# Patient Record
Sex: FEMALE | Race: WHITE | Employment: OTHER | URBAN - METROPOLITAN AREA
[De-identification: names, ages, dates, MRNs, and addresses within clinical notes are randomized per-mention and may not be internally consistent; named-entity substitution may affect disease eponyms.]

---

## 2022-10-09 ENCOUNTER — APPOINTMENT (OUTPATIENT)
Dept: GENERAL RADIOLOGY | Facility: CLINIC | Age: 87
DRG: 380 | End: 2022-10-09
Attending: EMERGENCY MEDICINE
Payer: MEDICARE

## 2022-10-09 ENCOUNTER — APPOINTMENT (OUTPATIENT)
Dept: CT IMAGING | Facility: CLINIC | Age: 87
DRG: 380 | End: 2022-10-09
Attending: EMERGENCY MEDICINE
Payer: MEDICARE

## 2022-10-09 ENCOUNTER — HOSPITAL ENCOUNTER (INPATIENT)
Facility: CLINIC | Age: 87
LOS: 4 days | Discharge: HOME | DRG: 380 | End: 2022-10-13
Attending: EMERGENCY MEDICINE | Admitting: INTERNAL MEDICINE
Payer: MEDICARE

## 2022-10-09 DIAGNOSIS — K29.00 ACUTE GASTRITIS WITHOUT HEMORRHAGE, UNSPECIFIED GASTRITIS TYPE: ICD-10-CM

## 2022-10-09 DIAGNOSIS — I5A MYOCARDIAL INJURY: ICD-10-CM

## 2022-10-09 DIAGNOSIS — I48.91 ATRIAL FIBRILLATION WITH RAPID VENTRICULAR RESPONSE (H): ICD-10-CM

## 2022-10-09 DIAGNOSIS — R11.2 NAUSEA AND VOMITING, UNSPECIFIED VOMITING TYPE: ICD-10-CM

## 2022-10-09 DIAGNOSIS — R93.89 THICKENED ENDOMETRIUM: ICD-10-CM

## 2022-10-09 LAB
ALBUMIN SERPL BCG-MCNC: 3.8 G/DL (ref 3.5–5.2)
ALBUMIN UR-MCNC: 20 MG/DL
ALP SERPL-CCNC: 52 U/L (ref 35–104)
ALT SERPL W P-5'-P-CCNC: 13 U/L (ref 10–35)
ANION GAP SERPL CALCULATED.3IONS-SCNC: 12 MMOL/L (ref 7–15)
APPEARANCE UR: CLEAR
AST SERPL W P-5'-P-CCNC: 21 U/L (ref 10–35)
BILIRUB DIRECT SERPL-MCNC: <0.2 MG/DL (ref 0–0.3)
BILIRUB SERPL-MCNC: 0.4 MG/DL
BILIRUB UR QL STRIP: NEGATIVE
BUN SERPL-MCNC: 26 MG/DL (ref 8–23)
CALCIUM SERPL-MCNC: 11.1 MG/DL (ref 8.8–10.2)
CHLORIDE SERPL-SCNC: 96 MMOL/L (ref 98–107)
COLOR UR AUTO: ABNORMAL
CREAT SERPL-MCNC: 1.49 MG/DL (ref 0.51–0.95)
DEPRECATED HCO3 PLAS-SCNC: 32 MMOL/L (ref 22–29)
ERYTHROCYTE [DISTWIDTH] IN BLOOD BY AUTOMATED COUNT: 12.5 % (ref 10–15)
GFR SERPL CREATININE-BSD FRML MDRD: 34 ML/MIN/1.73M2
GLUCOSE BLDC GLUCOMTR-MCNC: 122 MG/DL (ref 70–99)
GLUCOSE BLDC GLUCOMTR-MCNC: 147 MG/DL (ref 70–99)
GLUCOSE SERPL-MCNC: 218 MG/DL (ref 70–99)
GLUCOSE UR STRIP-MCNC: NEGATIVE MG/DL
HBA1C MFR BLD: 6.4 %
HCT VFR BLD AUTO: 40.5 % (ref 35–47)
HGB BLD-MCNC: 13.2 G/DL (ref 11.7–15.7)
HGB UR QL STRIP: NEGATIVE
HOLD SPECIMEN: NORMAL
HOLD SPECIMEN: NORMAL
KETONES UR STRIP-MCNC: NEGATIVE MG/DL
LEUKOCYTE ESTERASE UR QL STRIP: NEGATIVE
LIPASE SERPL-CCNC: 33 U/L (ref 13–60)
MAGNESIUM SERPL-MCNC: 1.8 MG/DL (ref 1.7–2.3)
MCH RBC QN AUTO: 30.1 PG (ref 26.5–33)
MCHC RBC AUTO-ENTMCNC: 32.6 G/DL (ref 31.5–36.5)
MCV RBC AUTO: 92 FL (ref 78–100)
NITRATE UR QL: NEGATIVE
PH UR STRIP: 7.5 [PH] (ref 5–7)
PLATELET # BLD AUTO: 215 10E3/UL (ref 150–450)
POTASSIUM SERPL-SCNC: 3.7 MMOL/L (ref 3.4–5.3)
PROCALCITONIN SERPL IA-MCNC: 0.03 NG/ML
PROT SERPL-MCNC: 7.1 G/DL (ref 6.4–8.3)
RBC # BLD AUTO: 4.39 10E6/UL (ref 3.8–5.2)
RBC URINE: 2 /HPF
SARS-COV-2 RNA RESP QL NAA+PROBE: NEGATIVE
SODIUM SERPL-SCNC: 140 MMOL/L (ref 136–145)
SP GR UR STRIP: 1.01 (ref 1–1.03)
SQUAMOUS EPITHELIAL: 1 /HPF
TROPONIN T SERPL HS-MCNC: 37 NG/L
TROPONIN T SERPL HS-MCNC: 37 NG/L
TSH SERPL DL<=0.005 MIU/L-ACNC: 3.12 UIU/ML (ref 0.3–4.2)
UFH PPP CHRO-ACNC: 0.18 IU/ML
UROBILINOGEN UR STRIP-MCNC: NORMAL MG/DL
WBC # BLD AUTO: 10.6 10E3/UL (ref 4–11)
WBC URINE: 8 /HPF

## 2022-10-09 PROCEDURE — 99223 1ST HOSP IP/OBS HIGH 75: CPT | Mod: AI | Performed by: INTERNAL MEDICINE

## 2022-10-09 PROCEDURE — 250N000011 HC RX IP 250 OP 636: Performed by: EMERGENCY MEDICINE

## 2022-10-09 PROCEDURE — 83690 ASSAY OF LIPASE: CPT | Performed by: EMERGENCY MEDICINE

## 2022-10-09 PROCEDURE — 84145 PROCALCITONIN (PCT): CPT | Performed by: INTERNAL MEDICINE

## 2022-10-09 PROCEDURE — C9113 INJ PANTOPRAZOLE SODIUM, VIA: HCPCS | Performed by: EMERGENCY MEDICINE

## 2022-10-09 PROCEDURE — 250N000011 HC RX IP 250 OP 636: Performed by: INTERNAL MEDICINE

## 2022-10-09 PROCEDURE — 76604 US EXAM CHEST: CPT

## 2022-10-09 PROCEDURE — C9803 HOPD COVID-19 SPEC COLLECT: HCPCS

## 2022-10-09 PROCEDURE — 120N000001 HC R&B MED SURG/OB

## 2022-10-09 PROCEDURE — 99285 EMERGENCY DEPT VISIT HI MDM: CPT | Mod: 25

## 2022-10-09 PROCEDURE — 96366 THER/PROPH/DIAG IV INF ADDON: CPT

## 2022-10-09 PROCEDURE — 96365 THER/PROPH/DIAG IV INF INIT: CPT

## 2022-10-09 PROCEDURE — 250N000013 HC RX MED GY IP 250 OP 250 PS 637: Performed by: EMERGENCY MEDICINE

## 2022-10-09 PROCEDURE — 85027 COMPLETE CBC AUTOMATED: CPT | Performed by: EMERGENCY MEDICINE

## 2022-10-09 PROCEDURE — 96361 HYDRATE IV INFUSION ADD-ON: CPT

## 2022-10-09 PROCEDURE — 84484 ASSAY OF TROPONIN QUANT: CPT | Performed by: EMERGENCY MEDICINE

## 2022-10-09 PROCEDURE — 36415 COLL VENOUS BLD VENIPUNCTURE: CPT | Performed by: INTERNAL MEDICINE

## 2022-10-09 PROCEDURE — 83735 ASSAY OF MAGNESIUM: CPT | Performed by: EMERGENCY MEDICINE

## 2022-10-09 PROCEDURE — 36415 COLL VENOUS BLD VENIPUNCTURE: CPT | Performed by: EMERGENCY MEDICINE

## 2022-10-09 PROCEDURE — 250N000013 HC RX MED GY IP 250 OP 250 PS 637: Performed by: INTERNAL MEDICINE

## 2022-10-09 PROCEDURE — 80048 BASIC METABOLIC PNL TOTAL CA: CPT | Performed by: EMERGENCY MEDICINE

## 2022-10-09 PROCEDURE — 84484 ASSAY OF TROPONIN QUANT: CPT | Performed by: INTERNAL MEDICINE

## 2022-10-09 PROCEDURE — 96376 TX/PRO/DX INJ SAME DRUG ADON: CPT

## 2022-10-09 PROCEDURE — 93005 ELECTROCARDIOGRAM TRACING: CPT | Mod: 76

## 2022-10-09 PROCEDURE — 83036 HEMOGLOBIN GLYCOSYLATED A1C: CPT | Performed by: INTERNAL MEDICINE

## 2022-10-09 PROCEDURE — U0005 INFEC AGEN DETEC AMPLI PROBE: HCPCS | Performed by: EMERGENCY MEDICINE

## 2022-10-09 PROCEDURE — 258N000003 HC RX IP 258 OP 636: Performed by: EMERGENCY MEDICINE

## 2022-10-09 PROCEDURE — C9113 INJ PANTOPRAZOLE SODIUM, VIA: HCPCS | Performed by: INTERNAL MEDICINE

## 2022-10-09 PROCEDURE — 999N000065 XR ABDOMEN 1 VIEW

## 2022-10-09 PROCEDURE — 81001 URINALYSIS AUTO W/SCOPE: CPT | Performed by: INTERNAL MEDICINE

## 2022-10-09 PROCEDURE — 250N000009 HC RX 250: Performed by: INTERNAL MEDICINE

## 2022-10-09 PROCEDURE — 84443 ASSAY THYROID STIM HORMONE: CPT | Performed by: EMERGENCY MEDICINE

## 2022-10-09 PROCEDURE — 85520 HEPARIN ASSAY: CPT | Performed by: EMERGENCY MEDICINE

## 2022-10-09 PROCEDURE — 80076 HEPATIC FUNCTION PANEL: CPT | Performed by: EMERGENCY MEDICINE

## 2022-10-09 PROCEDURE — 74177 CT ABD & PELVIS W/CONTRAST: CPT | Mod: MA

## 2022-10-09 PROCEDURE — 258N000003 HC RX IP 258 OP 636: Performed by: INTERNAL MEDICINE

## 2022-10-09 PROCEDURE — 93005 ELECTROCARDIOGRAM TRACING: CPT

## 2022-10-09 RX ORDER — ONDANSETRON 4 MG/1
4 TABLET, ORALLY DISINTEGRATING ORAL EVERY 6 HOURS PRN
Status: DISCONTINUED | OUTPATIENT
Start: 2022-10-09 | End: 2022-10-13 | Stop reason: HOSPADM

## 2022-10-09 RX ORDER — LIDOCAINE 40 MG/G
CREAM TOPICAL
Status: DISCONTINUED | OUTPATIENT
Start: 2022-10-09 | End: 2022-10-09

## 2022-10-09 RX ORDER — DILTIAZEM HYDROCHLORIDE 5 MG/ML
10 INJECTION INTRAVENOUS ONCE
Status: COMPLETED | OUTPATIENT
Start: 2022-10-09 | End: 2022-10-09

## 2022-10-09 RX ORDER — MAGNESIUM HYDROXIDE/ALUMINUM HYDROXICE/SIMETHICONE 120; 1200; 1200 MG/30ML; MG/30ML; MG/30ML
30 SUSPENSION ORAL ONCE
Status: COMPLETED | OUTPATIENT
Start: 2022-10-09 | End: 2022-10-09

## 2022-10-09 RX ORDER — IOPAMIDOL 755 MG/ML
500 INJECTION, SOLUTION INTRAVASCULAR ONCE
Status: COMPLETED | OUTPATIENT
Start: 2022-10-09 | End: 2022-10-09

## 2022-10-09 RX ORDER — DIPHENHYDRAMINE HYDROCHLORIDE AND LIDOCAINE HYDROCHLORIDE AND ALUMINUM HYDROXIDE AND MAGNESIUM HYDRO
10 KIT ONCE
Status: COMPLETED | OUTPATIENT
Start: 2022-10-09 | End: 2022-10-09

## 2022-10-09 RX ORDER — ONDANSETRON 2 MG/ML
4 INJECTION INTRAMUSCULAR; INTRAVENOUS ONCE
Status: COMPLETED | OUTPATIENT
Start: 2022-10-09 | End: 2022-10-09

## 2022-10-09 RX ORDER — NICOTINE POLACRILEX 4 MG
15-30 LOZENGE BUCCAL
Status: DISCONTINUED | OUTPATIENT
Start: 2022-10-09 | End: 2022-10-13 | Stop reason: HOSPADM

## 2022-10-09 RX ORDER — GABAPENTIN 100 MG/1
400 CAPSULE ORAL EVERY EVENING
COMMUNITY

## 2022-10-09 RX ORDER — NALOXONE HYDROCHLORIDE 0.4 MG/ML
0.2 INJECTION, SOLUTION INTRAMUSCULAR; INTRAVENOUS; SUBCUTANEOUS
Status: DISCONTINUED | OUTPATIENT
Start: 2022-10-09 | End: 2022-10-13 | Stop reason: HOSPADM

## 2022-10-09 RX ORDER — ACETAMINOPHEN 325 MG/1
650 TABLET ORAL EVERY 6 HOURS PRN
Status: DISCONTINUED | OUTPATIENT
Start: 2022-10-09 | End: 2022-10-13 | Stop reason: HOSPADM

## 2022-10-09 RX ORDER — LIDOCAINE 40 MG/G
CREAM TOPICAL
Status: DISCONTINUED | OUTPATIENT
Start: 2022-10-09 | End: 2022-10-13 | Stop reason: HOSPADM

## 2022-10-09 RX ORDER — GABAPENTIN 100 MG/1
100 CAPSULE ORAL AT BEDTIME
COMMUNITY

## 2022-10-09 RX ORDER — DEXTROSE MONOHYDRATE 25 G/50ML
25-50 INJECTION, SOLUTION INTRAVENOUS
Status: DISCONTINUED | OUTPATIENT
Start: 2022-10-09 | End: 2022-10-13 | Stop reason: HOSPADM

## 2022-10-09 RX ORDER — NALOXONE HYDROCHLORIDE 0.4 MG/ML
0.4 INJECTION, SOLUTION INTRAMUSCULAR; INTRAVENOUS; SUBCUTANEOUS
Status: DISCONTINUED | OUTPATIENT
Start: 2022-10-09 | End: 2022-10-13 | Stop reason: HOSPADM

## 2022-10-09 RX ORDER — HEPARIN SODIUM 10000 [USP'U]/100ML
0-5000 INJECTION, SOLUTION INTRAVENOUS CONTINUOUS
Status: DISCONTINUED | OUTPATIENT
Start: 2022-10-09 | End: 2022-10-09

## 2022-10-09 RX ORDER — AMOXICILLIN 250 MG
1 CAPSULE ORAL 2 TIMES DAILY PRN
Status: DISCONTINUED | OUTPATIENT
Start: 2022-10-09 | End: 2022-10-13 | Stop reason: HOSPADM

## 2022-10-09 RX ORDER — METOPROLOL SUCCINATE 50 MG/1
50 TABLET, EXTENDED RELEASE ORAL EVERY EVENING
Status: CANCELLED | OUTPATIENT
Start: 2022-10-09

## 2022-10-09 RX ORDER — NITROGLYCERIN 0.4 MG/1
0.4 TABLET SUBLINGUAL EVERY 5 MIN PRN
Status: DISCONTINUED | OUTPATIENT
Start: 2022-10-09 | End: 2022-10-13 | Stop reason: HOSPADM

## 2022-10-09 RX ORDER — METOPROLOL SUCCINATE 50 MG/1
50 TABLET, EXTENDED RELEASE ORAL EVERY EVENING
COMMUNITY
End: 2023-08-14

## 2022-10-09 RX ORDER — VALSARTAN 80 MG/1
80 TABLET ORAL DAILY
COMMUNITY

## 2022-10-09 RX ORDER — AMOXICILLIN 250 MG
2 CAPSULE ORAL 2 TIMES DAILY PRN
Status: DISCONTINUED | OUTPATIENT
Start: 2022-10-09 | End: 2022-10-13 | Stop reason: HOSPADM

## 2022-10-09 RX ORDER — METOPROLOL TARTRATE 1 MG/ML
5 INJECTION, SOLUTION INTRAVENOUS EVERY 6 HOURS
Status: DISCONTINUED | OUTPATIENT
Start: 2022-10-09 | End: 2022-10-10

## 2022-10-09 RX ORDER — AMPICILLIN AND SULBACTAM 2; 1 G/1; G/1
3 INJECTION, POWDER, FOR SOLUTION INTRAMUSCULAR; INTRAVENOUS EVERY 12 HOURS
Status: DISCONTINUED | OUTPATIENT
Start: 2022-10-09 | End: 2022-10-10

## 2022-10-09 RX ORDER — HYDROMORPHONE HCL IN WATER/PF 6 MG/30 ML
0.2 PATIENT CONTROLLED ANALGESIA SYRINGE INTRAVENOUS
Status: DISCONTINUED | OUTPATIENT
Start: 2022-10-09 | End: 2022-10-13 | Stop reason: HOSPADM

## 2022-10-09 RX ORDER — HYDROCHLOROTHIAZIDE 25 MG/1
25 TABLET ORAL DAILY
COMMUNITY

## 2022-10-09 RX ORDER — ACETAMINOPHEN 650 MG/1
650 SUPPOSITORY RECTAL EVERY 6 HOURS PRN
Status: DISCONTINUED | OUTPATIENT
Start: 2022-10-09 | End: 2022-10-13 | Stop reason: HOSPADM

## 2022-10-09 RX ORDER — ONDANSETRON 2 MG/ML
4 INJECTION INTRAMUSCULAR; INTRAVENOUS EVERY 6 HOURS PRN
Status: DISCONTINUED | OUTPATIENT
Start: 2022-10-09 | End: 2022-10-13 | Stop reason: HOSPADM

## 2022-10-09 RX ORDER — SODIUM CHLORIDE 9 MG/ML
INJECTION, SOLUTION INTRAVENOUS CONTINUOUS
Status: CANCELLED | OUTPATIENT
Start: 2022-10-09

## 2022-10-09 RX ORDER — ACETAMINOPHEN 500 MG
1000 TABLET ORAL EVERY 6 HOURS PRN
COMMUNITY

## 2022-10-09 RX ORDER — DILTIAZEM HCL IN NACL,ISO-OSM 125 MG/125
5-15 PLASTIC BAG, INJECTION (ML) INTRAVENOUS CONTINUOUS
Status: DISCONTINUED | OUTPATIENT
Start: 2022-10-09 | End: 2022-10-09

## 2022-10-09 RX ADMIN — ALUMINUM HYDROXIDE, MAGNESIUM HYDROXIDE, AND DIMETHICONE 30 ML: 200; 20; 200 SUSPENSION ORAL at 06:29

## 2022-10-09 RX ADMIN — METOPROLOL TARTRATE 5 MG: 5 INJECTION INTRAVENOUS at 20:47

## 2022-10-09 RX ADMIN — DEXTROSE AND SODIUM CHLORIDE: 5; 900 INJECTION, SOLUTION INTRAVENOUS at 15:50

## 2022-10-09 RX ADMIN — SODIUM CHLORIDE 250 ML: 9 INJECTION, SOLUTION INTRAVENOUS at 08:16

## 2022-10-09 RX ADMIN — ONDANSETRON 4 MG: 2 INJECTION INTRAMUSCULAR; INTRAVENOUS at 06:26

## 2022-10-09 RX ADMIN — SODIUM CHLORIDE 1000 ML: 9 INJECTION, SOLUTION INTRAVENOUS at 06:23

## 2022-10-09 RX ADMIN — AMPICILLIN SODIUM AND SULBACTAM SODIUM 3 G: 2; 1 INJECTION, POWDER, FOR SOLUTION INTRAMUSCULAR; INTRAVENOUS at 16:18

## 2022-10-09 RX ADMIN — DIPHENHYDRAMINE HYDROCHLORIDE AND LIDOCAINE HYDROCHLORIDE AND ALUMINUM HYDROXIDE AND MAGNESIUM HYDRO 10 ML: KIT at 20:51

## 2022-10-09 RX ADMIN — PANTOPRAZOLE SODIUM 40 MG: 40 INJECTION, POWDER, FOR SOLUTION INTRAVENOUS at 12:51

## 2022-10-09 RX ADMIN — METOPROLOL TARTRATE 5 MG: 5 INJECTION INTRAVENOUS at 15:52

## 2022-10-09 RX ADMIN — DILTIAZEM HYDROCHLORIDE 10 MG: 5 INJECTION INTRAVENOUS at 09:11

## 2022-10-09 RX ADMIN — HEPARIN SODIUM 600 UNITS/HR: 10000 INJECTION, SOLUTION INTRAVENOUS at 10:17

## 2022-10-09 RX ADMIN — HYDROMORPHONE HYDROCHLORIDE 0.2 MG: 0.2 INJECTION, SOLUTION INTRAMUSCULAR; INTRAVENOUS; SUBCUTANEOUS at 09:07

## 2022-10-09 RX ADMIN — PANTOPRAZOLE SODIUM 40 MG: 40 INJECTION, POWDER, FOR SOLUTION INTRAVENOUS at 20:44

## 2022-10-09 RX ADMIN — IOPAMIDOL 51 ML: 755 INJECTION, SOLUTION INTRAVENOUS at 07:48

## 2022-10-09 ASSESSMENT — ACTIVITIES OF DAILY LIVING (ADL)
VISION_MANAGEMENT: GLASSES
DOING_ERRANDS_INDEPENDENTLY_DIFFICULTY: NO
FALL_HISTORY_WITHIN_LAST_SIX_MONTHS: NO
DRESSING/BATHING_DIFFICULTY: NO
DIFFICULTY_EATING/SWALLOWING: NO
WALKING_OR_CLIMBING_STAIRS_DIFFICULTY: NO
CONCENTRATING,_REMEMBERING_OR_MAKING_DECISIONS_DIFFICULTY: NO
ADLS_ACUITY_SCORE: 35
ADLS_ACUITY_SCORE: 35
TOILETING_ISSUES: NO
ADLS_ACUITY_SCORE: 20
HEARING_DIFFICULTY_OR_DEAF: NO
ADLS_ACUITY_SCORE: 24
CHANGE_IN_FUNCTIONAL_STATUS_SINCE_ONSET_OF_CURRENT_ILLNESS/INJURY: NO
ADLS_ACUITY_SCORE: 24
ADLS_ACUITY_SCORE: 35
WEAR_GLASSES_OR_BLIND: YES
ADLS_ACUITY_SCORE: 35
ADLS_ACUITY_SCORE: 24
ADLS_ACUITY_SCORE: 20
DIFFICULTY_COMMUNICATING: NO

## 2022-10-09 NOTE — PHARMACY-ADMISSION MEDICATION HISTORY
Admission medication history interview status for this patient is complete. See [facility] admission navigator for allergy information, prior to admission medications and immunization status.     Medication history interview done, indicate source(s): Patient  Medication history resources (including written lists, pill bottles, clinic record):SureScripts and Care Everywhere  Pharmacy: [facility] on [address]    Changes made to PTA medication list:  Added: acetaminophen, valsartan, hctz  Changed: add sig to all meds.  Reported as Not Taking: None  Removed: aspirin    Actions taken by pharmacist (provider contacted, etc): Spoke with pt to verify med list.     Additional medication history information: Patient stated that she has not been taking the bedtime dose of gabapentin lately.    Medication reconciliation/reorder completed by provider prior to medication history?  N   (Y/N)     For patients on insulin therapy: N    Prior to Admission medications    Medication Sig Last Dose Taking? Auth Provider Long Term End Date   acetaminophen (TYLENOL) 500 MG tablet Take 2 tablets (1,000 mg) by mouth every 6 hours as needed for mild pain 10/7/2022 Yes Unknown, Entered By History     gabapentin (NEURONTIN) 100 MG capsule Take 4 capsules (400 mg) by mouth every evening 10/8/2022 Yes Unknown, Entered By History Yes    gabapentin (NEURONTIN) 100 MG capsule Take 1 capsule (100 mg) by mouth At Bedtime Past Week Yes Unknown, Entered By History Yes    gabapentin (NEURONTIN) 100 MG capsule Take 1 capsule (100 mg) by mouth every morning 10/8/2022 Yes Reported, Patient Yes    hydrochlorothiazide (HYDRODIURIL) 25 MG tablet Take 1 tablet (25 mg) by mouth daily 10/8/2022 Yes Unknown, Entered By History Yes    metFORMIN (GLUCOPHAGE) 500 MG tablet Take 1 tablet (500 mg) by mouth daily (with dinner) 10/8/2022 at pm Yes Reported, Patient Yes    metoprolol succinate ER (TOPROL XL) 50 MG 24 hr tablet Take 1 tablet (50 mg) by mouth every  evening 10/8/2022 at pm Yes Unknown, Entered By History Yes    Omega-3 Fatty Acids (OMEGA-3 FISH OIL PO) Take 1 capsule by mouth every morning 10/8/2022 at am Yes Reported, Patient     simvastatin (ZOCOR) 20 MG tablet Take 1 tablet (20 mg) by mouth At Bedtime 10/8/2022 at pm Yes Reported, Patient Yes    triamcinolone (KENALOG) 0.5 % cream Apply topically 2 times daily as needed Past Week Yes Reported, Patient     valsartan (DIOVAN) 80 MG tablet Take 1 tablet (80 mg) by mouth daily 10/8/2022 Yes Unknown, Entered By History Yes    Vitamin D (Cholecalciferol) 50 MCG (2000 UT) CAPS Take 2,000 Units by mouth daily 10/8/2022 Yes Reported, Patient

## 2022-10-09 NOTE — ED PROVIDER NOTES
History   Chief Complaint:  Chest Pain       HPI   Edith Thacker is a 87 year old female with history of CKD, hypertension, hyperlipidemia, atrial fibrillation RVR, and type II diabetes who presents with 5 days of feeling unwell.  Characterized by epigastric discomfort, nausea, multiple episodes of nonbilious nonbloody emesis, lower retrosternal chest discomfort.  Chest discomfort has been constant with waves of worsening.  Waves of worsening are not consistent with either eating or exertion.  She has eaten and drank very little over the last few days due to the discomfort.  No melena or hematochezia.  No sore throat cough or fever.  She has been taking her medications with the exception of this morning.  She lives alone and is here with her daughter.    She denies any known heart problems.  She has never been in atrial fibrillation before.  No if she ever had any venal thromboembolic disease.  No dysuria frequency urgency.    Review of Systems   ROS: 10 point ROS neg other than the symptoms noted above in the HPI.      Allergies:  Amlodipine    Medications:  Aspirin 81 mg  Gabapentin  Losartan potassium  Metformin  Metoprolol HCl  Simvastatin  Trazadone HCl  Hydrochlorothiazide  Diovan    Past Medical History:     CKD  Insomnia  Hypertension  Restless leg syndrome  Type II diabetes  Silvia of upper limb  Humerus fracture, Left   Atrial fibrillation RVR    Past Surgical History:    [place] teeth extraction  ORIF left olecranon fracture  D&C  Elbow hardware removal     Family History:    Father: cancer  Mother: heart disease    Social History:  The patient presents to the ED with her daughter.  The patient presents to the ED via car.  PCP: Tori Dawkins       Physical Exam     Patient Vitals for the past 24 hrs:   BP Temp Temp src Pulse Resp SpO2 Height Weight   10/09/22 1054 (!) 141/86 -- -- 89 -- 97 % -- --   10/09/22 1049 (!) 164/90 -- -- 82 -- 99 % -- --   10/09/22 1044 133/70 -- -- 94 -- 94 % -- --   10/09/22 1039  "117/64 -- -- 77 -- 100 % -- --   10/09/22 1034 124/84 -- -- 96 -- -- -- --   10/09/22 1029 119/65 -- -- 102 -- 96 % -- --   10/09/22 1024 92/57 -- -- 82 -- 96 % -- --   10/09/22 0958 111/67 -- -- 86 -- 99 % -- --   10/09/22 0953 118/68 -- -- 70 -- 95 % -- --   10/09/22 0948 118/59 -- -- 80 -- 93 % -- --   10/09/22 0943 115/58 -- -- 76 -- 94 % -- --   10/09/22 0938 108/57 -- -- 80 -- 90 % -- --   10/09/22 0935 114/55 -- -- 70 -- 97 % -- --   10/09/22 0930 106/60 -- -- 75 -- 95 % -- --   10/09/22 0925 98/59 -- -- 78 -- 95 % -- --   10/09/22 0920 98/59 -- -- 70 -- 98 % -- --   10/09/22 0915 124/63 -- -- (!) 121 -- 90 % -- --   10/09/22 0845 109/73 -- -- 114 -- 97 % -- --   10/09/22 0830 127/73 -- -- 120 -- -- -- --   10/09/22 0730 118/72 -- -- (!) 126 -- 96 % -- --   10/09/22 0700 119/73 -- -- 116 -- 98 % -- --   10/09/22 0618 (!) 140/75 -- -- (!) 146 -- 96 % -- --   10/09/22 0458 (!) 109/91 (!) 96.2  F (35.7  C) Temporal 71 20 93 % 1.575 m (5' 2\") 52 kg (114 lb 10.2 oz)       Physical Exam    HENT:  mmm, no rhinorrhea  Eyes: periorbital tissues and sclera normal   Neck: supple, no abnormal swelling  Lungs:  CTAB,  no resp distress  CV: Tachycardic, irregular, no m/r/g, ppi  Abd: soft, mild tenderness in the epigastrium and right upper quadrant, negative Lennon sign, nondistended, no rebound/masses/guarding/hsm  Ext: no peripheral edema, left upper extremity stump at the level of the elbow   skin: warm, dry, well perfused, no rashes/bruising/lesions on exposed skin  Neuro: alert, MAEE, no gross motor or sensory deficits, gait stable  Psych: Normal mood, normal affect      Emergency Department Course   ECG #1  ECG taken at 0504, ECG read at 0600  Atrial fibrillation with rapid ventricular response  Marked ST abnormality, possible inferior subendocardial injury  Abnormal ECG  Rate 140 bpm. VA interval * ms. QRS duration 78 ms. QT/QTc 276/421 ms. P-R-T axes * 21 207.     ECG #2  ECG taken at 0850, ECG read at 0854  Atrial " fibrillation with rapid ventricular response  Nonspecific ST abnormality  Abnormal ECG  Diffuse ST segment changes are improved compared to ECG #1  Rate 108 bpm. DC interval * ms. QRS duration 74 ms. QT/QTc 330/442 ms. P-R-T axes * -17 -31.     ECG #3  ECG taken at 1213, ECG read at 1213  Normal sinus rhythm  Nonspecific ST and T wave abnormality  Abnormal ECG  NSR replaced atrial fibrillation with RVR compared to ECG #2.  Rate 79 bpm. DC interval 188 ms. QRS duration 86 ms. QT/QTc 376/431 ms. P-R-T axes 61 -21 -54.     Imaging:  Abdomen XR 1 vw   Final Result   IMPRESSION: An OG tube sidehole is in the stomach. No distended air-filled loops of small bowel. There is a small amount of stool in the colon. No intraperitoneal free air. There is excreted contrast material in the bladder.       CT Chest (PE) Abdomen Pelvis w Contrast   Final Result   IMPRESSION:   1.  No evidence of pulmonary embolism.   2.  Cluster of small nodular opacities in the left lower lobe, could be infectious.   3.  Distal esophageal and gastric wall thickening, nonspecific, can be seen with esophagitis/gastritis or less likely neoplasm. This can be further evaluated with upper endoscopy as clinically warranted.   4.  Colonic diverticulosis without CT evidence of acute diverticulitis.   5.  Thickened endometrial stripe measuring 16 mm, worrisome for endometrial hyperplasia/neoplasia.         POC US ECHO LIMITED   Final Result   PROCEDURE NOTE --> Emergency Bedside Ultrasound      Procedure Name: Bedside Cardiac Ultrasound      Preformed by: Kevon Richmond MD      Indication - Chest Pain, Shortness of Breath, Palpitations       Probe: Phased array probe    Curvilinear parobe      Windows -     PSLA    4 Chamber    Bilateral lungs windows      Findings -    Contractility - grossly normal LV systolic function    Pericardial Fluid - none    Chamber Size -  preserved LV:RV ratio.  biatrial dilation,mild    Pleural effusion - not present     Pneumothorax - not present     Lung Parenchyma- no B lines present       Impression - Normal LV sys fxn, no e/o pulm edema.  No large pleural or pericardial effusion.       Images saved to PACS protocol           Report per radiology    Laboratory:  Labs Ordered and Resulted from Time of ED Arrival to Time of ED Departure   BASIC METABOLIC PANEL - Abnormal       Result Value    Sodium 140      Potassium 3.7      Chloride 96 (*)     Carbon Dioxide (CO2) 32 (*)     Anion Gap 12      Urea Nitrogen 26.0 (*)     Creatinine 1.49 (*)     Calcium 11.1 (*)     Glucose 218 (*)     GFR Estimate 34 (*)    TROPONIN T, HIGH SENSITIVITY - Abnormal    Troponin T, High Sensitivity 37 (*)    CBC WITH PLATELETS - Normal    WBC Count 10.6      RBC Count 4.39      Hemoglobin 13.2      Hematocrit 40.5      MCV 92      MCH 30.1      MCHC 32.6      RDW 12.5      Platelet Count 215     TSH WITH FREE T4 REFLEX - Normal    TSH 3.12     MAGNESIUM - Normal    Magnesium 1.8     HEPATIC FUNCTION PANEL - Normal    Protein Total 7.1      Albumin 3.8      Bilirubin Total 0.4      Alkaline Phosphatase 52      AST 21      ALT 13      Bilirubin Direct <0.20     LIPASE - Normal    Lipase 33     COVID-19 VIRUS (CORONAVIRUS) BY PCR - Normal    SARS CoV2 PCR Negative          Procedures        Emergency Department Course:     Reviewed:  I reviewed nursing notes, vitals, past medical history and Care Everywhere    Assessments:  0601 I obtained history and examined the patient as noted above.   0700 I rechecked the patient and performed bedside ultrasound.    Consults:  0841 I spoke with Jacoby from  regarding the patient's presentation and plan of care.  1019 I spoke with Dr. Young from the hospitalist service regarding the patient's presentation, findings here in the ED, and plan of care.  1215 I spoke with Dr. Young from the hospitalist service regarding the patient's presentation, findings here in the ED, and plan of  care.    Interventions:  0623 NS 1000 mL IV  0626 Zofran 4 mg IV  0629 Maalox 30 mL PO  0816  mL IV  0907 Dilaudid 0.2 mg IV  0911 Cardizem 10 mg IV  1016 Heparin loading low intensity 3100 units IV  1017 Heparin infusion 600 units/hr IV    Disposition:  The patient was admitted to the hospital under the care of Dr. Young.     Impression & Plan       Medical Decision Makin-year-old female here with 4 to 5 days of feeling unwell found to have new onset atrial fibrillation with rapid ventricular response.  Work-up to define if this is secondary due to underlying medical process or primary dysrhythmia.  Initial EKG shows rapid ventricular rate, atrial fibrillation with diffuse ST segment changes likely indicative of rate dependent change.  No indication for emergent angiography.  Likely hypovolemic given recent poor p.o. intake and vomiting.  We will crystalloid to establish euvolemia see effect on blood pressure and heart rate and add rate controlling medications from there.  Out of the window for electrical cardioversion.  Will need heparin given her [id number] score.  Will need hospital admission.    NG placed with about 700 cc out.  Rate slowed with diltiazem bolus.  Blood pressure went down in the upper 90s systolically.  This came up with just time alone and did not need to give more intravenous fluid.  Rate remained controlled and so did not need to start developed infusion.  Heparin was started and plan remains the same to admit to the hospitalist service.    Update: Sometime around noon patient spontaneously converted to normal sinus rhythm.  Discussed with the hospitalist and we will stop the heparin and need for anticoagulation will be reassessed at discharge.  She never needed the diltiazem infusion.      Diagnosis:    ICD-10-CM    1. Acute gastritis without hemorrhage, unspecified gastritis type  K29.00       2. Nausea and vomiting, unspecified vomiting type  R11.2       3. Atrial  fibrillation with rapid ventricular response (H)  I48.91       4. Myocardial injury  I5A       5. Thickened endometrium  R93.89           Scribe Disclosure:  I, Pramod Canales, am serving as a scribe at 5:56 AM on 10/9/2022 to document services personally performed by Kevon Richmond MD based on my observations and the provider's statements to me.        Kevon Richmond MD  10/09/22 1240

## 2022-10-09 NOTE — PLAN OF CARE
For vital signs and complete assessments, please see documentation flowsheets.     Pertinent assessments: Pt AOx4, elevated BP on RA, afebrile. NG tube to LIS. SBA to bathroom. Denies pain.    Major Shift Events: admitted to floor    Treatment Plan: Pain/symptom management, NG tube, endoscopy, GI    Bedside Nurse: Nadia Pappas RN

## 2022-10-09 NOTE — H&P
[facility]  Hospitalist Admission Note  Name: Edith Thacker    MRN: [id number]  YOB: 1935    Age: 87 year old  Date of admission: 10/9/2022  Primary care provider: Tori Dawkins    Chief Complaint:  Chest discomfort    Edith Thacker is a 87 year old female with PMH including type 2 diabetes and hypertension who presents with 5 days of upper abdominal/lower chest pain associated with nausea and anorexia.  She has been feeling generally unwell for about 5 days and has had multiple episodes of emesis and essentially is not eating or drinking much of anything in part due to the discomfort that she is feeling.  The symptoms are not exertional.  She denies any apparent melena or blood in her vomit.  Denies fevers or chills.  She denies any urinary changes.    Here in the emergency room she was afebrile.  Blood pressure was stable but she was found to be in atrial fibrillation with RVR with rates into the 140s.  Lab work-up was notable for acute kidney injury with creatinine of 1.49, up from a baseline of around 1.  Calcium was also elevated at 11.  CBC was grossly normal.  She underwent CT scan which showed no evidence of pulmonary embolism but showed a cluster of small nodular opacities in the left lower lobe as well as distal esophageal and gastric wall thickening.  There is also colonic diverticulosis and thickened endometrial stripe measuring 16 mm concerning for neoplasia.    She converted to sinus rhythm after being given 10 mg of diltiazem as well as IV fluids.  NG tube was also placed in the ER due to some concern regarding possible gastric outlet obstruction.  GI was contacted regarding her concerning esophageal/gastric findings with tentative plan for endoscopy in the next day or so.    Assessment and Plan:   1. Abdominal and lower chest pain: Appears to be GI in nature, somewhat concerning for possible gastric neoplastic process as well.  CT scan shows some evidence of esophagitis and  gastritis as well as a very distended stomach concerning for possible gastric outlet obstruction.  --Admit under inpatient status  --GI consult  --IV Protonix twice daily  --NG tube placed to low intermittent suction  --Continue IV fluids, antiemetics and electrolyte repletion  -- Check UA for completeness    2.   Atrial fibrillation with RVR: Has since converted to sinus rhythm.  This is a new diagnosis for her.  My high suspicion is this is provoked by #1 above.  She was initially started on a heparin drip in the ER but given that she will likely need to undergo endoscopy and is now back in sinus rhythm we will hold off on further anticoagulation for now.  --schedule metoprolol 5 mg Q6H IV, resume home PO metoprolol as able.  --Monitor on telemetry  --Repeating troponin x1  --Check echocardiogram  --Consider further ischemic work-up down the line    3.   Type 2 diabetes: Holding her home metformin given n.p.o. status.  Sliding scale ordered.    4.   History of hypertension: Continuing metoprolol as tolerated but holding hydrochlorothiazide and Diovan.    5.   Incidental finding of thickened endometrial stripe by CT scan: She and her daughter have been made aware of this and will need to follow-up with gynecology as an outpatient for further evaluation.    6.  Apparent small nodular opacities of the left lower lobe, possible pneumonia:   --Given multiple recent vomiting episodes will cover with Unasyn for possible aspiration pneumonia  -- Check procalcitonin, consider de-escalating antibiotics.    7.  Acute kidney injury: Likely prerenal in nature.  Continuing IV fluids overnight and rechecking a BMP in the morning.  Avoid nephrotoxins.    8.  Hypercalcemia: Calcium is 11.1.  Likely in part related to dehydration but would also consider occult malignancy.  -- Hydrate overnight, recheck in the morning  -- Hold home vitamin D, consider discontinuing this altogether    9.  Mild elevation in troponin: Suspect demand  ischemia.  Holding off on heparin.  Recheck for now.  Checking echo as above.    DVT Prophylaxis: Pneumatic Compression Devices  Code Status: DNR / DNI, discussed.  Discharge Dispo: Admit under inpatient status      History of Present Illness:  Edith Thacker is a 87 year old female with PMH including type 2 diabetes and hypertension who presents with 5 days of upper abdominal/lower chest pain associated with nausea and anorexia.  She has been feeling generally unwell for about 5 days and has had multiple episodes of emesis and essentially is not eating or drinking much of anything in part due to the discomfort that she is feeling.  The symptoms are not exertional.  She denies any apparent melena or blood in her vomit.  Denies fevers or chills.  She denies any urinary changes.    She is accompanied to the ER by her daughter who helps corroborate some of the history.    We did discuss CODE STATUS and she wants to be DNR/DNI    The incidental findings from her CT scan were conveyed to her and her daughter including concerns regarding thickened endometrial stripe on her CT scan.       Surgical History    Surgical History  Surgery Date Site/Laterality Comments   WISDOM TEETH EXTRACTION          orif left olecranon fracture 11/14 Left Dr Walsh     DILATION AND CURETTAGE 4/15   Dr Hurd: benign endometrial polyps     ELBOW HARDWARE REMOVAL 9/22/15 Left Dr. 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     Family History    Family History  Medical History Relation Name Comments   Good Health Brother  x1     Cancer Father    d: 78 - unknown type   Heart Disease Mother    d: 76   Good Health Sister  x6       Family History  Relation Name Status Comments   Brother  x1       Father          Mother          Sister  x6         Social  "History    Social History  Tobacco Use Types Packs/Day Years Used Date   Never Smoker           Smokeless Tobacco: Never Used           Social History  Tobacco Cessation: Counseling Given: No     Social History  Alcohol Use Standard Drinks/Week Comments   Yes 0 (1 standard drink = 0.6 oz pure alcohol) rare           Allergies:  Allergies   Allergen Reactions     Amlodipine Swelling     3+ LE edema on amlodipine 5 mg.     Medications:    No current facility-administered medications on file prior to encounter.  acetaminophen (TYLENOL) 500 MG tablet, Take 2 tablets (1,000 mg) by mouth every 6 hours as needed for mild pain  gabapentin (NEURONTIN) 100 MG capsule, Take 4 capsules (400 mg) by mouth every evening  gabapentin (NEURONTIN) 100 MG capsule, Take 1 capsule (100 mg) by mouth At Bedtime  gabapentin (NEURONTIN) 100 MG capsule, Take 1 capsule (100 mg) by mouth every morning  hydrochlorothiazide (HYDRODIURIL) 25 MG tablet, Take 1 tablet (25 mg) by mouth daily  metFORMIN (GLUCOPHAGE) 500 MG tablet, Take 1 tablet (500 mg) by mouth daily (with dinner)  metoprolol succinate ER (TOPROL XL) 50 MG 24 hr tablet, Take 1 tablet (50 mg) by mouth every evening  Omega-3 Fatty Acids (OMEGA-3 FISH OIL PO), Take 1 capsule by mouth every morning  simvastatin (ZOCOR) 20 MG tablet, Take 1 tablet (20 mg) by mouth At Bedtime  triamcinolone (KENALOG) 0.5 % cream, Apply topically 2 times daily as needed  valsartan (DIOVAN) 80 MG tablet, Take 1 tablet (80 mg) by mouth daily  Vitamin D (Cholecalciferol) 50 MCG (2000 UT) CAPS, Take 2,000 Units by mouth daily          Review of Systems:  A Comprehensive greater than 10 system review of systems was carried out.  Pertinent positives and negatives are noted above.  Otherwise negative for contributory information.     Physical Exam:  Blood pressure (!) 141/86, pulse 77, temperature (!) 96.2  F (35.7  C), temperature source Temporal, resp. rate 20, height 1.575 m (5' 2\"), weight 52 kg (114 lb 10.2 " oz), SpO2 98 %.  Wt Readings from Last 1 Encounters:   10/09/22 52 kg (114 lb 10.2 oz)     Exam:  General: Alert, awake, no acute distress.  HEENT: NC/AT, eyes anicteric, external occular movements intact, face symmetric.   Cardiac: RRR, S1, S2.  No murmurs appreciated.  Pulmonary: Normal chest rise, normal work of breathing.  Lungs CTA BL  Abdomen: NG tube in place, soft, non-tender, non-distended.  Bowel Sounds Present.  No guarding.  Extremities: no deformities.  Warm, well perfused.  Skin: no rashes or lesions noted.  Warm and Dry.  Neuro: No focal deficits noted.  Speech clear.  Coordination and strength grossly normal.  Psych: Appropriate affect.    Data:  EKG: Atrial fibrillation with RVR  Imaging:  Results for orders placed or performed during the hospital encounter of 10/09/22   POC US ECHO LIMITED    Impression    PROCEDURE NOTE --> Emergency Bedside Ultrasound    Procedure Name: Bedside Cardiac Ultrasound    Preformed by: Kevon Richmond MD    Indication - Chest Pain, Shortness of Breath, Palpitations     Probe: Phased array probe   Curvilinear parobe    Windows -    PSLA   4 Chamber   Bilateral lungs windows    Findings -   Contractility - grossly normal LV systolic function   Pericardial Fluid - none   Chamber Size -  preserved LV:RV ratio.  biatrial dilation,mild   Pleural effusion - not present   Pneumothorax - not present    Lung Parenchyma- no B lines present     Impression - Normal LV sys fxn, no e/o pulm edema.  No large pleural or pericardial effusion.     Images saved to PACS protocol     CT Chest (PE) Abdomen Pelvis w Contrast    Narrative    EXAM: CT CHEST PE, ABDOMEN AND PELVIS WITH CONTRAST  LOCATION: [facility]  DATE/TIME: 10/09/2022, 7:53 AM    INDICATION: Dyspnea, chest pain, new A-fib, epigastric pain.  COMPARISON: CT abdomen and pelvis on 04/20/2015.  TECHNIQUE: CT chest pulmonary angiogram and routine CT abdomen pelvis with IV contrast. Arterial phase through the  chest and venous phase through the abdomen and pelvis. Multiplanar reformats and MIP reconstructions were performed. Dose reduction   techniques were used.   CONTRAST: 51 mL Isovue 370.    FINDINGS:  ANGIOGRAM CHEST: No evidence of pulmonary embolism.    LUNGS AND PLEURA: No pleural effusion or pneumothorax. Cluster of small nodular pulmonary opacities in the lateral aspect of the left lower lobe (series 7 image 128), could be infectious. A few scattered pulmonary nodules including 2 mm left upper lobe   nodule (series 7 image 100).    MEDIASTINUM/AXILLAE: No cardiomegaly or significant pericardial effusion. Distal esophageal wall thickening with small hiatal hernia.    CORONARY ARTERY CALCIFICATION: Mild.    HEPATOBILIARY: Multiple hypodense foci in the liver, some can be characterized as liver cysts including 1.2 cm in hepatic segment 2 (series 11 image 41) while multiple others are subcentimeter and too small to characterize. The gallbladder is   unremarkable.    PANCREAS: No main pancreatic ductal dilatation or definite solid pancreatic mass.    SPLEEN: No splenomegaly.    ADRENAL GLANDS: No adrenal nodules.    KIDNEYS/BLADDER: No radiodense kidney/ureteral stones or hydronephrosis in either kidney.    BOWEL: No abnormally dilated bowel loops. The appendix is visualized and appears normal. Colonic diverticulosis without CT evidence of acute diverticulitis. Mild gastric wall thickening of the distal stomach (series 11 image 81).    PERITONEUM: No significant free fluid in the abdomen or pelvis.    LYMPH NODES: No significant abdominopelvic lymphadenopathy.    VASCULATURE: Moderate atherosclerotic vascular calcification of the abdominal aorta and iliac vessels.    PELVIC ORGANS: Thickened endometrial stripe measuring 16 mm in thickness, worrisome for endometrial hyperplasia/neoplasia.    MUSCULOSKELETAL: Multilevel degenerative changes of the spine. No suspicious osseous lesion.      Impression    IMPRESSION:  1.   No evidence of pulmonary embolism.  2.  Cluster of small nodular opacities in the left lower lobe, could be infectious.  3.  Distal esophageal and gastric wall thickening, nonspecific, can be seen with esophagitis/gastritis or less likely neoplasm. This can be further evaluated with upper endoscopy as clinically warranted.  4.  Colonic diverticulosis without CT evidence of acute diverticulitis.  5.  Thickened endometrial stripe measuring 16 mm, worrisome for endometrial hyperplasia/neoplasia.     Abdomen XR 1 vw    Narrative    EXAM: XR ABDOMEN 1 VIEW  LOCATION: [facility]  DATE/TIME: 10/9/2022 11:32 AM    INDICATION: NG tube placement  COMPARISON: CT 10/09/2022 at 0750 hours       Impression    IMPRESSION: An OG tube sidehole is in the stomach. No distended air-filled loops of small bowel. There is a small amount of stool in the colon. No intraperitoneal free air. There is excreted contrast material in the bladder.      Labs:  Recent Labs   Lab 10/09/22  0512   WBC 10.6   HGB 13.2   HCT 40.5   MCV 92             Lab Results   Component Value Date     10/09/2022     04/20/2015    Lab Results   Component Value Date    CHLORIDE 96 10/09/2022    CHLORIDE 104 04/20/2015    Lab Results   Component Value Date    BUN 26.0 10/09/2022    BUN 16 04/20/2015      Lab Results   Component Value Date    POTASSIUM 3.7 10/09/2022    POTASSIUM 4.2 04/20/2015    Lab Results   Component Value Date    CO2 32 10/09/2022    CO2 28 04/20/2015    Lab Results   Component Value Date    CR 1.49 10/09/2022    CR 1.06 04/20/2015            Cristino Morin MD  Hospitalist  [facility]

## 2022-10-09 NOTE — ED NOTES
[facility]  ED Nurse Handoff Report    Edith Thacker is a 87 year old female   ED Chief complaint: Chest Pain  . ED Diagnosis:   Final diagnoses:   Acute gastritis without hemorrhage, unspecified gastritis type   Nausea and vomiting, unspecified vomiting type   Atrial fibrillation with rapid ventricular response (H)   Myocardial injury   Thickened endometrium     Allergies:   Allergies   Allergen Reactions     Amlodipine Swelling     3+ LE edema on amlodipine 5 mg.       Code Status: Full Code  Activity level - Baseline/Home:  Independent. Activity Level - Current:   Independent. Lift room needed: No. Bariatric: No   Needed: No   Isolation: No. Infection: Not Applicable.     Vital Signs:   Vitals:    10/09/22 1039 10/09/22 1044 10/09/22 1049 10/09/22 1054   BP: 117/64 133/70 (!) 164/90 (!) 141/86   Pulse: 77 94 82 89   Resp:       Temp:       TempSrc:       SpO2: 100% 94% 99% 97%   Weight:       Height:           Cardiac Rhythm:  ,      Pain level:    Patient confused: No. Patient Falls Risk: No.   Elimination Status: Has voided   Patient Report - Initial Complaint: Pt arrives to ED with c/o midsternal CP and burning that has been ongoing for a few days. Pt tried tums without relief. Pt denies cardiac hx, possible afib in triage. . Focused Assessment: Midsternal chest pain, epigastric burning, Afib with RVR on arrival  Tests Performed: labs and imaging. Abnormal Results:   Abnormal Labs Resulted from Time of ED Arrival to Time of ED Departure   BASIC METABOLIC PANEL - Abnormal       Result Value    Sodium 140      Potassium 3.7      Chloride 96 (*)     Carbon Dioxide (CO2) 32 (*)     Anion Gap 12      Urea Nitrogen 26.0 (*)     Creatinine 1.49 (*)     Calcium 11.1 (*)     Glucose 218 (*)     GFR Estimate 34 (*)    TROPONIN T, HIGH SENSITIVITY - Abnormal    Troponin T, High Sensitivity 37 (*)      CT Chest (PE) Abdomen Pelvis w Contrast   Final Result   IMPRESSION:   1.  No evidence of pulmonary  Detail Level: Simple embolism.   2.  Cluster of small nodular opacities in the left lower lobe, could be infectious.   3.  Distal esophageal and gastric wall thickening, nonspecific, can be seen with esophagitis/gastritis or less likely neoplasm. This can be further evaluated with upper endoscopy as clinically warranted.   4.  Colonic diverticulosis without CT evidence of acute diverticulitis.   5.  Thickened endometrial stripe measuring 16 mm, worrisome for endometrial hyperplasia/neoplasia.         POC US ECHO LIMITED   Final Result   PROCEDURE NOTE --> Emergency Bedside Ultrasound      Procedure Name: Bedside Cardiac Ultrasound      Preformed by: Kevon Richmond MD      Indication - Chest Pain, Shortness of Breath, Palpitations       Probe: Phased array probe    Curvilinear parobe      Windows -     PSLA    4 Chamber    Bilateral lungs windows      Findings -    Contractility - grossly normal LV systolic function    Pericardial Fluid - none    Chamber Size -  preserved LV:RV ratio.  biatrial dilation,mild    Pleural effusion - not present    Pneumothorax - not present     Lung Parenchyma- no B lines present       Impression - Normal LV sys fxn, no e/o pulm edema.  No large pleural or pericardial effusion.       Images saved to PACS protocol         Abdomen XR 1 vw    (Results Pending)        Treatments provided: see MAR, NG tube placed  Family Comments: daughter at bedside  OBS brochure/video discussed/provided to patient:  N/A  ED Medications:   Medications   diltiazem (CARDIZEM) 125 mg in sodium chloride 0.7 % 125 mL infusion (has no administration in time range)   HYDROmorphone (DILAUDID) injection 0.2 mg (0.2 mg Intravenous Given 10/9/22 0907)   heparin infusion 25,000 units in D5W 250 mL ANTICOAGULANT (600 Units/hr Intravenous New Bag 10/9/22 1017)   0.9% sodium chloride BOLUS (0 mLs Intravenous Stopped 10/9/22 0740)   ondansetron (ZOFRAN) injection 4 mg (4 mg Intravenous Given 10/9/22 0626)   alum & mag hydroxide-simethicone  (MAALOX) suspension 30 mL (30 mLs Oral Given 10/9/22 0629)   0.9% sodium chloride BOLUS (0 mLs Intravenous Stopped 10/9/22 0850)   diltiazem (CARDIZEM) injection 10 mg (10 mg Intravenous Given 10/9/22 0911)   sodium chloride (PF) 0.9% PF flush 100 mL (72 mLs Intravenous Given 10/9/22 0748)   iopamidol (ISOVUE-370) solution 500 mL (51 mLs Intravenous Given 10/9/22 0748)   heparin loading dose for LOW INTENSITY TREATMENT * Give BEFORE starting heparin infusion (3,100 Units Intravenous Given 10/9/22 1016)     Drips infusing:  No  For the majority of the shift, the patient's behavior Green. Interventions performed were n/a.    Sepsis treatment initiated: No     Patient tested for COVID 19 prior to admission: YES    ED Nurse Name/Phone Number: Katie Cooper RN,   11:00 AM  RECEIVING UNIT ED HANDOFF REVIEW    Above ED Nurse Handoff Report was reviewed: Yes  Reviewed by: Nadia Pappas RN on October 9, 2022 at 1:48 PM        Detail Level: Zone

## 2022-10-09 NOTE — ED TRIAGE NOTES
Pt arrives to ED with c/o midsternal CP and burning that has been ongoing for a few days. Pt tried tums without relief. Pt denies cardiac hx, possible afib in triage.        Include Location In Plan?: No Detail Level: Generalized

## 2022-10-10 ENCOUNTER — APPOINTMENT (OUTPATIENT)
Dept: CARDIOLOGY | Facility: CLINIC | Age: 87
DRG: 380 | End: 2022-10-10
Attending: INTERNAL MEDICINE
Payer: MEDICARE

## 2022-10-10 LAB
ANION GAP SERPL CALCULATED.3IONS-SCNC: 6 MMOL/L (ref 7–15)
ATRIAL RATE - MUSE: 141 BPM
ATRIAL RATE - MUSE: 79 BPM
ATRIAL RATE - MUSE: 96 BPM
BUN SERPL-MCNC: 18.3 MG/DL (ref 8–23)
CALCIUM SERPL-MCNC: 9.1 MG/DL (ref 8.8–10.2)
CHLORIDE SERPL-SCNC: 104 MMOL/L (ref 98–107)
CREAT SERPL-MCNC: 1.56 MG/DL (ref 0.51–0.95)
DEPRECATED HCO3 PLAS-SCNC: 35 MMOL/L (ref 22–29)
DIASTOLIC BLOOD PRESSURE - MUSE: NORMAL MMHG
GFR SERPL CREATININE-BSD FRML MDRD: 32 ML/MIN/1.73M2
GLUCOSE BLDC GLUCOMTR-MCNC: 124 MG/DL (ref 70–99)
GLUCOSE BLDC GLUCOMTR-MCNC: 130 MG/DL (ref 70–99)
GLUCOSE BLDC GLUCOMTR-MCNC: 137 MG/DL (ref 70–99)
GLUCOSE BLDC GLUCOMTR-MCNC: 140 MG/DL (ref 70–99)
GLUCOSE BLDC GLUCOMTR-MCNC: 144 MG/DL (ref 70–99)
GLUCOSE BLDC GLUCOMTR-MCNC: 155 MG/DL (ref 70–99)
GLUCOSE SERPL-MCNC: 147 MG/DL (ref 70–99)
INTERPRETATION ECG - MUSE: NORMAL
LVEF ECHO: NORMAL
MAGNESIUM SERPL-MCNC: 1.9 MG/DL (ref 1.7–2.3)
P AXIS - MUSE: 61 DEGREES
P AXIS - MUSE: NORMAL DEGREES
P AXIS - MUSE: NORMAL DEGREES
POTASSIUM SERPL-SCNC: 4.2 MMOL/L (ref 3.4–5.3)
PR INTERVAL - MUSE: 188 MS
PR INTERVAL - MUSE: NORMAL MS
PR INTERVAL - MUSE: NORMAL MS
QRS DURATION - MUSE: 74 MS
QRS DURATION - MUSE: 78 MS
QRS DURATION - MUSE: 86 MS
QT - MUSE: 276 MS
QT - MUSE: 330 MS
QT - MUSE: 376 MS
QTC - MUSE: 421 MS
QTC - MUSE: 431 MS
QTC - MUSE: 442 MS
R AXIS - MUSE: -17 DEGREES
R AXIS - MUSE: -21 DEGREES
R AXIS - MUSE: 21 DEGREES
SODIUM SERPL-SCNC: 145 MMOL/L (ref 136–145)
SYSTOLIC BLOOD PRESSURE - MUSE: NORMAL MMHG
T AXIS - MUSE: -31 DEGREES
T AXIS - MUSE: -54 DEGREES
T AXIS - MUSE: 207 DEGREES
UPPER GI ENDOSCOPY: NORMAL
VENTRICULAR RATE- MUSE: 108 BPM
VENTRICULAR RATE- MUSE: 140 BPM
VENTRICULAR RATE- MUSE: 79 BPM

## 2022-10-10 PROCEDURE — 93306 TTE W/DOPPLER COMPLETE: CPT | Mod: 26 | Performed by: INTERNAL MEDICINE

## 2022-10-10 PROCEDURE — 83735 ASSAY OF MAGNESIUM: CPT | Performed by: INTERNAL MEDICINE

## 2022-10-10 PROCEDURE — 250N000011 HC RX IP 250 OP 636: Performed by: INTERNAL MEDICINE

## 2022-10-10 PROCEDURE — 88305 TISSUE EXAM BY PATHOLOGIST: CPT | Mod: TC | Performed by: INTERNAL MEDICINE

## 2022-10-10 PROCEDURE — 250N000013 HC RX MED GY IP 250 OP 250 PS 637: Performed by: INTERNAL MEDICINE

## 2022-10-10 PROCEDURE — 80048 BASIC METABOLIC PNL TOTAL CA: CPT | Performed by: INTERNAL MEDICINE

## 2022-10-10 PROCEDURE — 250N000009 HC RX 250: Performed by: INTERNAL MEDICINE

## 2022-10-10 PROCEDURE — 0DB68ZX EXCISION OF STOMACH, VIA NATURAL OR ARTIFICIAL OPENING ENDOSCOPIC, DIAGNOSTIC: ICD-10-PCS | Performed by: INTERNAL MEDICINE

## 2022-10-10 PROCEDURE — 93306 TTE W/DOPPLER COMPLETE: CPT

## 2022-10-10 PROCEDURE — 36415 COLL VENOUS BLD VENIPUNCTURE: CPT | Performed by: INTERNAL MEDICINE

## 2022-10-10 PROCEDURE — C9113 INJ PANTOPRAZOLE SODIUM, VIA: HCPCS | Performed by: INTERNAL MEDICINE

## 2022-10-10 PROCEDURE — 250N000012 HC RX MED GY IP 250 OP 636 PS 637: Performed by: INTERNAL MEDICINE

## 2022-10-10 PROCEDURE — 43239 EGD BIOPSY SINGLE/MULTIPLE: CPT | Performed by: INTERNAL MEDICINE

## 2022-10-10 PROCEDURE — 120N000001 HC R&B MED SURG/OB

## 2022-10-10 PROCEDURE — 258N000003 HC RX IP 258 OP 636: Performed by: INTERNAL MEDICINE

## 2022-10-10 PROCEDURE — 999N000099 HC STATISTIC MODERATE SEDATION < 10 MIN: Performed by: INTERNAL MEDICINE

## 2022-10-10 PROCEDURE — 99233 SBSQ HOSP IP/OBS HIGH 50: CPT | Performed by: INTERNAL MEDICINE

## 2022-10-10 RX ORDER — LIDOCAINE 40 MG/G
CREAM TOPICAL
Status: DISCONTINUED | OUTPATIENT
Start: 2022-10-10 | End: 2022-10-10 | Stop reason: HOSPADM

## 2022-10-10 RX ORDER — NALOXONE HYDROCHLORIDE 0.4 MG/ML
0.2 INJECTION, SOLUTION INTRAMUSCULAR; INTRAVENOUS; SUBCUTANEOUS
Status: DISCONTINUED | OUTPATIENT
Start: 2022-10-10 | End: 2022-10-10 | Stop reason: HOSPADM

## 2022-10-10 RX ORDER — METOPROLOL SUCCINATE 50 MG/1
50 TABLET, EXTENDED RELEASE ORAL EVERY EVENING
Status: DISCONTINUED | OUTPATIENT
Start: 2022-10-10 | End: 2022-10-13 | Stop reason: HOSPADM

## 2022-10-10 RX ORDER — FLUMAZENIL 0.1 MG/ML
0.2 INJECTION, SOLUTION INTRAVENOUS
Status: DISCONTINUED | OUTPATIENT
Start: 2022-10-10 | End: 2022-10-10 | Stop reason: HOSPADM

## 2022-10-10 RX ORDER — GABAPENTIN 400 MG/1
400 CAPSULE ORAL EVERY EVENING
Status: DISCONTINUED | OUTPATIENT
Start: 2022-10-10 | End: 2022-10-13 | Stop reason: HOSPADM

## 2022-10-10 RX ORDER — EPINEPHRINE 1 MG/ML
0.1 INJECTION, SOLUTION, CONCENTRATE INTRAVENOUS
Status: DISCONTINUED | OUTPATIENT
Start: 2022-10-10 | End: 2022-10-10 | Stop reason: HOSPADM

## 2022-10-10 RX ORDER — SIMETHICONE 40MG/0.6ML
133 SUSPENSION, DROPS(FINAL DOSAGE FORM)(ML) ORAL
Status: DISCONTINUED | OUTPATIENT
Start: 2022-10-10 | End: 2022-10-10 | Stop reason: HOSPADM

## 2022-10-10 RX ORDER — DIPHENHYDRAMINE HYDROCHLORIDE 50 MG/ML
25-50 INJECTION INTRAMUSCULAR; INTRAVENOUS
Status: DISCONTINUED | OUTPATIENT
Start: 2022-10-10 | End: 2022-10-10 | Stop reason: HOSPADM

## 2022-10-10 RX ORDER — NALOXONE HYDROCHLORIDE 0.4 MG/ML
0.4 INJECTION, SOLUTION INTRAMUSCULAR; INTRAVENOUS; SUBCUTANEOUS
Status: DISCONTINUED | OUTPATIENT
Start: 2022-10-10 | End: 2022-10-10 | Stop reason: HOSPADM

## 2022-10-10 RX ORDER — GABAPENTIN 100 MG/1
100 CAPSULE ORAL AT BEDTIME
Status: DISCONTINUED | OUTPATIENT
Start: 2022-10-10 | End: 2022-10-13 | Stop reason: HOSPADM

## 2022-10-10 RX ORDER — FLUMAZENIL 0.1 MG/ML
0.2 INJECTION, SOLUTION INTRAVENOUS
Status: ACTIVE | OUTPATIENT
Start: 2022-10-10 | End: 2022-10-11

## 2022-10-10 RX ORDER — GABAPENTIN 100 MG/1
100 CAPSULE ORAL EVERY MORNING
Status: DISCONTINUED | OUTPATIENT
Start: 2022-10-11 | End: 2022-10-13 | Stop reason: HOSPADM

## 2022-10-10 RX ORDER — FENTANYL CITRATE 50 UG/ML
50-100 INJECTION, SOLUTION INTRAMUSCULAR; INTRAVENOUS EVERY 5 MIN PRN
Status: DISCONTINUED | OUTPATIENT
Start: 2022-10-10 | End: 2022-10-10 | Stop reason: HOSPADM

## 2022-10-10 RX ORDER — ATROPINE SULFATE 0.1 MG/ML
1 INJECTION INTRAVENOUS
Status: DISCONTINUED | OUTPATIENT
Start: 2022-10-10 | End: 2022-10-10 | Stop reason: HOSPADM

## 2022-10-10 RX ADMIN — INSULIN ASPART 1 UNITS: 100 INJECTION, SOLUTION INTRAVENOUS; SUBCUTANEOUS at 21:35

## 2022-10-10 RX ADMIN — PANTOPRAZOLE SODIUM 40 MG: 40 INJECTION, POWDER, FOR SOLUTION INTRAVENOUS at 09:46

## 2022-10-10 RX ADMIN — METOPROLOL TARTRATE 5 MG: 5 INJECTION INTRAVENOUS at 09:46

## 2022-10-10 RX ADMIN — GABAPENTIN 400 MG: 400 CAPSULE ORAL at 17:56

## 2022-10-10 RX ADMIN — DEXTROSE AND SODIUM CHLORIDE: 5; 900 INJECTION, SOLUTION INTRAVENOUS at 15:25

## 2022-10-10 RX ADMIN — GABAPENTIN 100 MG: 100 CAPSULE ORAL at 21:33

## 2022-10-10 RX ADMIN — DEXTROSE AND SODIUM CHLORIDE: 5; 900 INJECTION, SOLUTION INTRAVENOUS at 05:22

## 2022-10-10 RX ADMIN — MIDAZOLAM HYDROCHLORIDE 1 MG: 1 INJECTION, SOLUTION INTRAMUSCULAR; INTRAVENOUS at 12:05

## 2022-10-10 RX ADMIN — AMPICILLIN SODIUM AND SULBACTAM SODIUM 3 G: 2; 1 INJECTION, POWDER, FOR SOLUTION INTRAMUSCULAR; INTRAVENOUS at 03:37

## 2022-10-10 RX ADMIN — METOPROLOL TARTRATE 5 MG: 5 INJECTION INTRAVENOUS at 03:36

## 2022-10-10 RX ADMIN — TOPICAL ANESTHETIC 0.5 ML: 200 SPRAY DENTAL; PERIODONTAL at 12:02

## 2022-10-10 RX ADMIN — METOPROLOL SUCCINATE 50 MG: 50 TABLET, EXTENDED RELEASE ORAL at 15:29

## 2022-10-10 RX ADMIN — FENTANYL CITRATE 50 MCG: 50 INJECTION INTRAMUSCULAR; INTRAVENOUS at 12:04

## 2022-10-10 RX ADMIN — PANTOPRAZOLE SODIUM 40 MG: 40 INJECTION, POWDER, FOR SOLUTION INTRAVENOUS at 21:33

## 2022-10-10 ASSESSMENT — ACTIVITIES OF DAILY LIVING (ADL)
ADLS_ACUITY_SCORE: 24

## 2022-10-10 NOTE — PLAN OF CARE
Pertinent assessments: Alert and oriented x4, VSS, RA.  Endoscopy this am.  Noted gastric ulcer on iv Protonix, removed NGT started on clears, estephania well advanced to full liquids. Denies pain n/v, bloating.  IVF lowered to 75cc/hr  Major Shift Events: Endo increased diet. Treatment Plan: symptom management, monitor tolorance on diet.      Bedside Nurse Michelle Christine RN

## 2022-10-10 NOTE — PROGRESS NOTES
[facility]    Medicine Progress Note - Hospitalist Service    Date of Admission:  10/9/2022    Assessment & Plan             Edith Thacker is a 87 year old female with PMH including type 2 diabetes and hypertension who presents with 5 days of upper abdominal/lower chest pain associated with nausea and anorexia.  She has been feeling generally unwell for about 5 days and has had multiple episodes of emesis and essentially is not eating or drinking much of anything in part due to the discomfort that she is feeling.  The symptoms are not exertional.  She denies any apparent melena or blood in her vomit.  Denies fevers or chills.  She denies any urinary changes.     Here in the emergency room she was afebrile.  Blood pressure was stable but she was found to be in atrial fibrillation with RVR with rates into the 140s.  Lab work-up was notable for acute kidney injury with creatinine of 1.49, up from a baseline of around 1.  Calcium was also elevated at 11.  CBC was grossly normal.  She underwent CT scan which showed no evidence of pulmonary embolism but showed a cluster of small nodular opacities in the left lower lobe as well as distal esophageal and gastric wall thickening.  There is also colonic diverticulosis and thickened endometrial stripe measuring 16 mm concerning for neoplasia.     She converted to sinus rhythm after being given 10 mg of diltiazem as well as IV fluids.  NG tube was also placed in the ER due to some concern regarding possible gastric outlet obstruction.  GI was contacted regarding her concerning esophageal/gastric findings with tentative plan for endoscopy in the next day or so.     Assessment and Plan:   1. Abdominal and lower chest pain: Appears to be GI in nature, somewhat concerning for possible gastric neoplastic process as well.  CT scan shows some evidence of esophagitis and gastritis as well as a very distended stomach concerning for possible gastric outlet  obstruction.  -Appreciate prompt input from GI service  -Had significant drainage from NG tube decompression earlier  -Feeling much better now.  We will continue current n.p.o. status, NG tube and receiving IV PPI  --Continue IV fluids, antiemetics and electrolyte repletion  -- Check UA for completeness-resulted no clear evidence of infectious process     2.   Atrial fibrillation with RVR: Has since converted to sinus rhythm.  This is a new diagnosis for her.  My high suspicion is this is provoked by #1 above.  She was initially started on a heparin drip in the ER but given that she will likely need to undergo endoscopy and is now back in sinus rhythm we will hold off on further anticoagulation for now.  -Currently NSR  -May resume home regimen of metoprolol once on a diet  --schedule metoprolol 5 mg Q6H IV, resume home PO metoprolol as able.  --Monitor on telemetry  --Repeating troponin x1-resulted flat levels at 37.  Doubtful for ACS process likely had an underlying type II demand ischemia process from earlier significant symptomatology of vomiting, abdominal discomfort  --Check echocardiogram-pending results  --Consider further ischemic work-up down the line     3.   Type 2 diabetes: Holding her home metformin given n.p.o. status.  Sliding scale ordered.     4.   History of hypertension: Continuing metoprolol as tolerated but holding hydrochlorothiazide and Diovan.     5.   Incidental finding of thickened endometrial stripe by CT scan: She and her daughter have been made aware of this earlier by my colleagues and will need to follow-up with gynecology as an outpatient for further evaluation.     6.  Apparent small nodular opacities of the left lower lobe, possible pneumonia:   --Given multiple recent vomiting episodes will cover with Unasyn for possible aspiration pneumonia  -- Check procalcitonin, consider de-escalating antibiotics.  -Procalcitonin negative at 0.03  -May stop antibiotics       7.    Chronic  kidney disease stage III   -No evidence of acute kidney injury:   -Creatinine remained baseline.  Care everywhere notes showing creatinine hovers between 1.5- 1.7  -Most recent creatinine stable    8.  Hypercalcemia: Calcium is 11.1.  Likely in part related to dehydration but would also consider occult malignancy.  -- Hydrate overnight, recheck in the morning  -- Hold home vitamin D, consider discontinuing this altogether  -This has resolved with most recent level at 9.1     9.  Mild elevation in troponin: Suspect demand ischemia.  Holding off on heparin.  Recheck for now.  Checking echo as above.     DVT Prophylaxis: Pneumatic Compression Devices  Code Status: DNR / DNI, discussed.  Discharge Dispo: Admit under inpatient status      Addendum: I was notified and also discussed with GI service regarding findings EGD showing significant nonbleeding gastric ulcer.  Status postbiopsy.  Recommendations to continue PPI.  Slowly advance diet see how much she can tolerate and if she is not able to tolerate oral diet then alternative route of nutrition can be further extrapolated and discussed with her possibly postpyloric given findings of this ulcer with inflammation and edema leading to likely a possible functional obstruction there.  She also likely not a good candidate for anticoagulation if she continues to show atrial fibrillation in the setting of higher propensity of bleeding tendencies given this large gastric ulcer.  NG tube was removed.  Advance diet.  Change IV metoprolol to her home dose.  Stop antibiotics.       Diet: NPO per Anesthesia Guidelines for Procedure/Surgery Except for: Meds    DVT Prophylaxis: Pneumatic Compression Devices  Branham Catheter: Not present  Central Lines: None  Cardiac Monitoring: ACTIVE order. Indication: Tachyarrhythmias, acute (48 hours)  Code Status: No CPR- Do NOT Intubate      Disposition Plan     Expected Discharge Date: Anticipating that she will be requiring inpatient care at  "least in the next 1-2 more days        The patient's care was discussed with the Patient and Patient's Family.    Alexandre Field MD, MD  Hospitalist Service  [facility]  Securely message with the Vocera Web Console (learn more here)  Text page via [employer] Paging/Directory         Clinically Significant Risk Factors Present on Admission               # Overweight: Estimated body mass index is 28.44 kg/m  as calculated from the following:    Height as of this encounter: 1.549 m (5' 1\").    Weight as of this encounter: 68.3 kg (150 lb 8 oz).        ______________________________________________________________________    Interval History   I assume service care today.  Seen and examined.  Chart reviewed.  Family updated as one of her daughters present at bedside during my encounter.  I met this very pleasant elderly lady while she is ambulating from the bathroom going to her hospital chair with no assistance.  She is not hypoxic and not requiring any oxygen support.  No reported worsening abdominal symptoms such as nausea, sensation of being bloated, vomiting or abdominal pain.  Currently tolerating her NG tube.  Having some anticipated throat discomfort but no shortness of breath, nor any episodes of chest pain or palpitations.  Remained afebrile.  Mental state at baseline levels.    Data reviewed today: I reviewed all medications, new labs and imaging results over the last 24 hours. I personally reviewed .Spoke with telemetry monitoring and remained NSR.  No recurrent atrial fibrillation    Physical Exam   Vital Signs: Temp: 98.4  F (36.9  C) Temp src: Oral BP: (!) 156/45 Pulse: 71   Resp: 16 SpO2: 94 % O2 Device: None (Room air)    Weight: 150 lbs 8 oz  HEENT; Atraumatic, normocephalic, pinkish conjuctiva, pupils bilateral reactive   NG tube present  Skin: warm and moist, no rashes    Lungs: equal chest expansion, clear to auscultation, no wheezes, no stridor, no crackles,   Heart: normal " rate, normal rhythm, no rubs or gallops.   Abdomen: normal bowel sounds, no tenderness, no peritoneal signs, no guarding  Extremities: no deformities, no edema   Absence of left forearm  Neuro; follow commands, alert and oriented x3, spontaneous speech, coherent, moves all extremities spontaneously  Psych; no hallucination, euthymic mood, not agitated        Data   Recent Labs   Lab 10/10/22  0809 10/10/22  0707 10/10/22  0359 10/09/22  1607 10/09/22  0512   WBC  --   --   --   --  10.6   HGB  --   --   --   --  13.2   MCV  --   --   --   --  92   PLT  --   --   --   --  215   NA  --  145  --   --  140   POTASSIUM  --  4.2  --   --  3.7   CHLORIDE  --  104  --   --  96*   CO2  --  35*  --   --  32*   BUN  --  18.3  --   --  26.0*   CR  --  1.56*  --   --  1.49*   ANIONGAP  --  6*  --   --  12   LOUISE  --  9.1  --   --  11.1*   * 147* 124*   < > 218*   ALBUMIN  --   --   --   --  3.8   PROTTOTAL  --   --   --   --  7.1   BILITOTAL  --   --   --   --  0.4   ALKPHOS  --   --   --   --  52   ALT  --   --   --   --  13   AST  --   --   --   --  21   LIPASE  --   --   --   --  33    < > = values in this interval not displayed.     Recent Results (from the past 24 hour(s))   Abdomen XR 1 vw    Narrative    EXAM: XR ABDOMEN 1 VIEW  LOCATION: [facility]  DATE/TIME: 10/9/2022 11:32 AM    INDICATION: NG tube placement  COMPARISON: CT 10/09/2022 at 0750 hours       Impression    IMPRESSION: An OG tube sidehole is in the stomach. No distended air-filled loops of small bowel. There is a small amount of stool in the colon. No intraperitoneal free air. There is excreted contrast material in the bladder.      Medications     dextrose 5% and 0.9% NaCl 100 mL/hr at 10/10/22 0522     - MEDICATION INSTRUCTIONS -         ampicillin-sulbactam  3 g Intravenous Q12H     insulin aspart  1-7 Units Subcutaneous Q4H     metoprolol  5 mg Intravenous Q6H     pantoprazole  40 mg Intravenous BID     sodium chloride (PF)   3 mL Intracatheter Q8H     sodium chloride (PF)  3 mL Intracatheter Q8H

## 2022-10-10 NOTE — PLAN OF CARE
End of Shift Summary  For vital signs and complete assessments, please see documentation flowsheets.     Pertinent assessments: Alert and oriented x4, VSS, afebrile, room air, NG tube to LIS. SBA to bathroom. Denies pain/nausea, up to bathroom SBA, voiding without difficulty, bowel sounds hypo    Major Shift Events: uneventful, slept well between cares     Treatment Plan: symptom management, NG tube, upper endoscopy today    Bedside Nurse: Reema Pryor RN

## 2022-10-10 NOTE — CONSULTS
GASTROENTEROLOGY CONSULTATION      Edith Thacker  [id number] [facility] [id number]  87 year old female     Admission Date/Time: 10/9/2022  Primary Care Provider: Tori Dawkins     We were asked to see the patient in consultation by Dr. Morin for evaluation of gastric outlet obstruction.    CC: epigastric pain, chest pain, vomiting     HPI:  Edith Thacker is a 87 year old female with past medical history significant for type 2 diabetes, hypertension, admitted October 9 with upper abdominal pain, chest pain, nausea, vomiting.    Patient reports that about 5 days ago developed epigastric discomfort with burning chest pain.  Symptoms were initially intermittent but became more persistent over the last couple days and not responding to Tums.  3 days ago nausea was more severe and she self induced vomiting.  Over the last 2 days, she has had intermittent nausea and vomiting.  She has decreased her oral intake to manage the symptoms.  Last bowel movement was 2 days ago.  She denies any associated constipation, diarrhea, melena, hematochezia, hematemesis, fevers, chills.    She is not on aspirin or any blood thinners.  She denies NSAID use.  She does not smoke.  Denies alcohol use. NG placed with initial 1500ml removed, 200mL recorded today. Pain, n/v has resolved.     On presentation to the emergency room, she was noted to be in A. fib with RVR.     Labs showed elevated creatinine at 1.49, normal transaminases and bilirubin, lipase.  White blood cell count, hemoglobin, platelets all normal. Troponin elevated 37 x2.     Echocardiogram showed normal LV function.     CT chest abdomen and pelvis with IV contrast showed no evidence of PE, cluster of small nodular pulmonary opacities in the left lower lobe, possibly infectious, a few scattered pulmonary nodules in the left upper lobe, nonspecific thickening at the distal esophagus and the distal stomach wall thickening, thickening of the endometrium.     No prior EGD.      PAST MEDICAL HISTORY:  Patient Active Problem List    Diagnosis Date Noted     Myocardial injury 10/09/2022     Priority: Medium     Thickened endometrium 10/09/2022     Priority: Medium     Atrial fibrillation with rapid ventricular response (H) 10/09/2022     Priority: Medium     Acute gastritis without hemorrhage, unspecified gastritis type 10/09/2022     Priority: Medium     Nausea and vomiting, unspecified vomiting type 10/09/2022     Priority: Medium          ROS: A comprehensive ten point review of systems was negative aside from those in mentioned in the HPI.       MEDICATIONS:   Prior to Admission medications    Medication Sig Start Date End Date Taking? Authorizing Provider   acetaminophen (TYLENOL) 500 MG tablet Take 2 tablets (1,000 mg) by mouth every 6 hours as needed for mild pain   Yes Unknown, Entered By History   gabapentin (NEURONTIN) 100 MG capsule Take 4 capsules (400 mg) by mouth every evening   Yes Unknown, Entered By History   gabapentin (NEURONTIN) 100 MG capsule Take 1 capsule (100 mg) by mouth At Bedtime   Yes Unknown, Entered By History   gabapentin (NEURONTIN) 100 MG capsule Take 1 capsule (100 mg) by mouth every morning   Yes Reported, Patient   hydrochlorothiazide (HYDRODIURIL) 25 MG tablet Take 1 tablet (25 mg) by mouth daily   Yes Unknown, Entered By History   metFORMIN (GLUCOPHAGE) 500 MG tablet Take 1 tablet (500 mg) by mouth daily (with dinner)   Yes Reported, Patient   metoprolol succinate ER (TOPROL XL) 50 MG 24 hr tablet Take 1 tablet (50 mg) by mouth every evening   Yes Unknown, Entered By History   Omega-3 Fatty Acids (OMEGA-3 FISH OIL PO) Take 1 capsule by mouth every morning   Yes Reported, Patient   simvastatin (ZOCOR) 20 MG tablet Take 1 tablet (20 mg) by mouth At Bedtime   Yes Reported, Patient   triamcinolone (KENALOG) 0.5 % cream Apply topically 2 times daily as needed   Yes Reported, Patient   valsartan (DIOVAN) 80 MG tablet Take 1 tablet (80 mg) by mouth daily    "Yes Unknown, Entered By History   Vitamin D (Cholecalciferol) 50 MCG (2000 UT) CAPS Take 2,000 Units by mouth daily   Yes Reported, Patient        ALLERGIES:   Allergies   Allergen Reactions     Amlodipine Swelling     3+ LE edema on amlodipine 5 mg.        SOCIAL HISTORY:  Social History     Tobacco Use     Smoking status: Never   Substance Use Topics     Alcohol use: Yes     Drug use: No        FAMILY HISTORY:  No family history on file.     PHYSICAL EXAM:   BP (!) 156/45 (BP Location: Right arm)   Pulse 71   Temp 98.4  F (36.9  C) (Oral)   Resp 16   Ht 1.549 m (5' 1\")   Wt 68.3 kg (150 lb 8 oz)   SpO2 94%   BMI 28.44 kg/m       PHYSICAL EXAM:  General: alert, oriented, NAD  SKIN: no suspicious lesions, rashes, jaundice, or spider angiomas  HEAD: Normocephalic. No masses, lesions, tenderness or abnormalities  NECK: Neck supple. No adenopathy. Thyroid symmetric, normal size.  EYES: No scleral icterus  ENT: ENT exam normal, no neck nodes or sinus tenderness  RESPIRATORY: negative, Good diaphragmatic excursion. Lungs clear  CARDIOVASCULAR: negative, PMI normal. No lifts, heaves, or thrills. RRR. No murmurs, clicks gallops or rub  GASTROINTESTINAL: +BS, soft, NT, ND, no HSM, no masses/guarding/rebound  JOINT/EXTREMITIES: extremities normal- no gross deformities noted, gait normal and normal muscle tone  NEURO: Reflexes grossly normal and symmetric. Sensation grossly WNL.  PSYCH: no abnormal anxiety/depression  LYMPH: No anterior cervical, posterior cervical, or supraclavicular adenopathy     LABS:  I reviewed the patient's new clinical lab test results.   Recent Labs   Lab Test 10/09/22  0512 04/20/15  2326   WBC 10.6 6.2   HGB 13.2 13.4   MCV 92 90    200   INR  --  0.90     Recent Labs   Lab Test 10/10/22  0707 10/09/22  0512 04/20/15  2326    140 138   POTASSIUM 4.2 3.7 4.2   CHLORIDE 104 96* 104   CO2 35* 32* 28   BUN 18.3 26.0* 16   ANIONGAP 6* 12 6   LOUISE 9.1 11.1* 8.7     Recent Labs   Lab " Test 10/09/22  2105 10/09/22  0512 04/20/15  2326 04/20/15  2315   ALBUMIN  --  3.8 3.4  --    BILITOTAL  --  0.4 0.3  --    ALT  --  13 18  --    AST  --  21 11  --    ALKPHOS  --  52 53  --    PROTEIN 20*  --   --  Negative   LIPASE  --  33  --   --         IMAGING  I personally reviewed the patient's new imaging results.    EXAM: CT CHEST PE, ABDOMEN AND PELVIS WITH CONTRAST  LOCATION: [facility]  DATE/TIME: 10/09/2022, 7:53 AM     INDICATION: Dyspnea, chest pain, new A-fib, epigastric pain.  COMPARISON: CT abdomen and pelvis on 04/20/2015.  TECHNIQUE: CT chest pulmonary angiogram and routine CT abdomen pelvis with IV contrast. Arterial phase through the chest and venous phase through the abdomen and pelvis. Multiplanar reformats and MIP reconstructions were performed. Dose reduction   techniques were used.   CONTRAST: 51 mL Isovue 370.     FINDINGS:  ANGIOGRAM CHEST: No evidence of pulmonary embolism.     LUNGS AND PLEURA: No pleural effusion or pneumothorax. Cluster of small nodular pulmonary opacities in the lateral aspect of the left lower lobe (series 7 image 128), could be infectious. A few scattered pulmonary nodules including 2 mm left upper lobe   nodule (series 7 image 100).     MEDIASTINUM/AXILLAE: No cardiomegaly or significant pericardial effusion. Distal esophageal wall thickening with small hiatal hernia.     CORONARY ARTERY CALCIFICATION: Mild.     HEPATOBILIARY: Multiple hypodense foci in the liver, some can be characterized as liver cysts including 1.2 cm in hepatic segment 2 (series 11 image 41) while multiple others are subcentimeter and too small to characterize. The gallbladder is   unremarkable.     PANCREAS: No main pancreatic ductal dilatation or definite solid pancreatic mass.     SPLEEN: No splenomegaly.     ADRENAL GLANDS: No adrenal nodules.     KIDNEYS/BLADDER: No radiodense kidney/ureteral stones or hydronephrosis in either kidney.     BOWEL: No abnormally  dilated bowel loops. The appendix is visualized and appears normal. Colonic diverticulosis without CT evidence of acute diverticulitis. Mild gastric wall thickening of the distal stomach (series 11 image 81).     PERITONEUM: No significant free fluid in the abdomen or pelvis.     LYMPH NODES: No significant abdominopelvic lymphadenopathy.     VASCULATURE: Moderate atherosclerotic vascular calcification of the abdominal aorta and iliac vessels.     PELVIC ORGANS: Thickened endometrial stripe measuring 16 mm in thickness, worrisome for endometrial hyperplasia/neoplasia.     MUSCULOSKELETAL: Multilevel degenerative changes of the spine. No suspicious osseous lesion.                                                                      IMPRESSION:  1.  No evidence of pulmonary embolism.  2.  Cluster of small nodular opacities in the left lower lobe, could be infectious.  3.  Distal esophageal and gastric wall thickening, nonspecific, can be seen with esophagitis/gastritis or less likely neoplasm. This can be further evaluated with upper endoscopy as clinically warranted.  4.  Colonic diverticulosis without CT evidence of acute diverticulitis.  5.  Thickened endometrial stripe measuring 16 mm, worrisome for endometrial hyperplasia/neoplasia.     CONSULTATION ASSESSMENT AND PLAN:    87 year old female with past medical history significant for type 2 diabetes, hypertension, admitted October 9 with upper abdominal pain, chest pain, nausea, vomiting. CT showing thickening of the distal esophagus and stomach, thickened endometrium, left lung nodular opacities.     1. Epigastric pain. No clear signs of gastric outlet obstruction on CT scan although considered with n/v. Differential includes infection, peptic ulcer disease, malignancy, or secondary finding related to nausea/vomiting. No signs of GI bleeding. HGB normal. NG tube has resolved symptoms and had large output initially.  --Tentatively scheduled for EGD later this  morning.   --Continue NPO/NG decompression.   --IV PPI BID.  --Will discuss heparin with Dr. Lopez.     2. Afib with RVR. Echocardiogram unremarkable. On IV heparin. Elevated troponin noted.     3. Lung nodular opacities. Started on IV Unasyn for possible PNA.    Total time spent:  Approximately, 45 minutes was spent providing patient care, including patient evaluation, reviewing documentation/test results, and . Thank you for asking us to participate in the care of this patient.    Sindhu Duran, PAC  [facility] ([facility])

## 2022-10-10 NOTE — PLAN OF CARE
Vitals: WNL  Neuro: A/Ox4  Cardiac: on Tele, SR 70s, no chest pain, asymptomatic  GI: no BM, no n/v  : voided in bathroom  Resp: no SoB  Activity: SBA with IV pole  Pain: denies any pain  Skin: no pressure injuries / pertinent skin issues     Urine sample collected and sent. Blood sugars 142 and 147(Q4 checks), NPO except ice/meds     Plan: IV fluid, Unasyn, IV Protonix, NG tube to LIS, upper endoscopy tomorrow - NPO post midnight

## 2022-10-10 NOTE — CONSULTS
"CLINICAL NUTRITION SERVICES  -  ASSESSMENT NOTE    Recommendations Ordered by Registered Dietitian (RD):   Diet advancement per MD - currently on clears    Malnutrition:   % Weight Loss: none documented   % Intake:  Decreased intake does not meet criteria for malnutrition   Subcutaneous Fat Loss:  Upper arm region mild depletion  --> will not use given only one indicator   Muscle Loss:  Scapular bone region mild depletion --> will not use given only one indicator   Fluid Retention: none documented     Malnutrition Diagnosis: Patient does not meet two of the above criteria necessary for diagnosing malnutrition      REASON FOR ASSESSMENT  Edith Thacker is a 87 year old female seen by Registered Dietitian for Admission Nutrition Risk Screen for positive    Past medical history:  type 2 diabetes and hypertension     Admitted for: Abdominal and lower chest pain    NUTRITION HISTORY  - Information obtained from patient and chart   - Typical food/fluid intake PTA: pt reports a change to her appetite x 1 day PTA, notes that was eating smaller portion sizes. Describes feeling of \"heartburn\" after eating however the sensation remained even after medications (tums).   - Supplements: none   - Food allergies: NKFA    CURRENT NUTRITION ORDERS  Diet Order:     Clear Liquids     Current Intake/Tolerance:  First tray of clear liquids in room at time of visit     Obtained from Chart/Interdisciplinary Team:  - GI consulted - upper endoscopy planned for 10/10  - Reviewed stooling patterns - none to comment on   - No documented PI    ANTHROPOMETRICS  Height: 5' 1\"  Weight: 68.3 kg ( 150 lbs 8 oz)   Body mass index is 28.44 kg/m .  Weight Status:  Overweight BMI 25-29.9  Weight History: No weight loss noted over the past 1 year, limited weights to comment on however. Patient reports a usual body weight of 150 lbs.   Wt Readings from Last 10 Encounters:   10/10/22 68.3 kg (150 lb 8 oz)     Per care everywhere:   67.1 kg (148 lb) 11/02/2021 " 9:00 AM CDT         LABS  Labs reviewed    Labs:  Electrolytes  Potassium (mmol/L)   Date Value   10/10/2022 4.2   10/09/2022 3.7   04/20/2015 4.2    Blood Glucose  Glucose (mg/dL)   Date Value   10/10/2022 147 (H)   04/20/2015 117 (H)     GLUCOSE BY METER POCT (mg/dL)   Date Value   10/10/2022 140 (H)   10/10/2022 124 (H)   10/10/2022 137 (H)   10/09/2022 147 (H)   10/09/2022 122 (H)     Hemoglobin A1C (%)   Date Value   10/09/2022 6.4 (H)    Inflammatory Markers  WBC (10e9/L)   Date Value   04/20/2015 6.2     WBC Count (10e3/uL)   Date Value   10/09/2022 10.6     Albumin (g/dL)   Date Value   10/09/2022 3.8   04/20/2015 3.4      Magnesium (mg/dL)   Date Value   10/09/2022 1.8     Sodium (mmol/L)   Date Value   10/10/2022 145   10/09/2022 140   04/20/2015 138    Renal  Urea Nitrogen (mg/dL)   Date Value   10/10/2022 18.3   10/09/2022 26.0 (H)   04/20/2015 16     Creatinine (mg/dL)   Date Value   10/10/2022 1.56 (H)   10/09/2022 1.49 (H)   04/20/2015 1.06 (H)     Additional  Ketones Urine (mg/dL)   Date Value   10/09/2022 Negative   04/20/2015 Negative        MEDICATIONS  Medications reviewed      ampicillin-sulbactam  3 g Intravenous Q12H     insulin aspart  1-7 Units Subcutaneous Q4H     metoprolol  5 mg Intravenous Q6H     pantoprazole  40 mg Intravenous BID     sodium chloride (PF)  3 mL Intracatheter Q8H     sodium chloride (PF)  3 mL Intracatheter Q8H        dextrose 5% and 0.9% NaCl 100 mL/hr at 10/10/22 0522     - MEDICATION INSTRUCTIONS -        acetaminophen **OR** acetaminophen, glucose **OR** dextrose **OR** glucagon, HYDROmorphone, lidocaine 4%, lidocaine (buffered or not buffered), melatonin, naloxone **OR** naloxone **OR** naloxone **OR** naloxone, nitroGLYcerin, ondansetron **OR** ondansetron, - MEDICATION INSTRUCTIONS -, senna-docusate **OR** senna-docusate, sodium chloride (PF), sodium chloride (PF)     ASSESSED NUTRITION NEEDS PER APPROVED PRACTICE GUIDELINES:    Dosing Weight 68.3 kg   Estimated  Energy Needs: 3238-0622 kcals (20-25 Kcal/Kg)  Justification: maintenance  Estimated Protein Needs: 68-82 grams protein (1-1.2 g pro/Kg)  Justification: preservation of lean body mass  Estimated Fluid Needs: per MD     NUTRITION DIAGNOSIS:  Predicted inadequate nutrient intake (protein and energy) related to potential for PO intake to decline further pending clinical course and diet advancement     NUTRITION INTERVENTIONS  Recommendations / Nutrition Prescription  Diet advancement per MD - currently on clears     Implementation  Nutrition education: Provided education on the role of the RD     Nutrition Goals  Diet to advance >/=fulls in the next 24-48 hours     MONITORING AND EVALUATION:  Progress towards goals will be monitored and evaluated per protocol and Practice Guidelines    Graciela Soto RD, LD  Clinical Dietitian     3rd floor/ICU: [phone]  All other floors: [phone]  Weekend/holiday: [phone]  Office: [phone]

## 2022-10-11 LAB
ANION GAP SERPL CALCULATED.3IONS-SCNC: 6 MMOL/L (ref 7–15)
BASOPHILS # BLD AUTO: 0 10E3/UL (ref 0–0.2)
BASOPHILS NFR BLD AUTO: 0 %
BUN SERPL-MCNC: 13.5 MG/DL (ref 8–23)
CALCIUM SERPL-MCNC: 8.2 MG/DL (ref 8.8–10.2)
CHLORIDE SERPL-SCNC: 107 MMOL/L (ref 98–107)
CREAT SERPL-MCNC: 1.43 MG/DL (ref 0.51–0.95)
DEPRECATED HCO3 PLAS-SCNC: 29 MMOL/L (ref 22–29)
EOSINOPHIL # BLD AUTO: 0.3 10E3/UL (ref 0–0.7)
EOSINOPHIL NFR BLD AUTO: 6 %
ERYTHROCYTE [DISTWIDTH] IN BLOOD BY AUTOMATED COUNT: 12.4 % (ref 10–15)
GFR SERPL CREATININE-BSD FRML MDRD: 36 ML/MIN/1.73M2
GLUCOSE BLDC GLUCOMTR-MCNC: 105 MG/DL (ref 70–99)
GLUCOSE BLDC GLUCOMTR-MCNC: 106 MG/DL (ref 70–99)
GLUCOSE BLDC GLUCOMTR-MCNC: 118 MG/DL (ref 70–99)
GLUCOSE BLDC GLUCOMTR-MCNC: 118 MG/DL (ref 70–99)
GLUCOSE BLDC GLUCOMTR-MCNC: 125 MG/DL (ref 70–99)
GLUCOSE BLDC GLUCOMTR-MCNC: 132 MG/DL (ref 70–99)
GLUCOSE BLDC GLUCOMTR-MCNC: 141 MG/DL (ref 70–99)
GLUCOSE SERPL-MCNC: 137 MG/DL (ref 70–99)
HCT VFR BLD AUTO: 32.4 % (ref 35–47)
HGB BLD-MCNC: 10.1 G/DL (ref 11.7–15.7)
IMM GRANULOCYTES # BLD: 0 10E3/UL
IMM GRANULOCYTES NFR BLD: 0 %
LYMPHOCYTES # BLD AUTO: 1.4 10E3/UL (ref 0.8–5.3)
LYMPHOCYTES NFR BLD AUTO: 27 %
MAGNESIUM SERPL-MCNC: 1.9 MG/DL (ref 1.7–2.3)
MCH RBC QN AUTO: 29.6 PG (ref 26.5–33)
MCHC RBC AUTO-ENTMCNC: 31.2 G/DL (ref 31.5–36.5)
MCV RBC AUTO: 95 FL (ref 78–100)
MONOCYTES # BLD AUTO: 0.6 10E3/UL (ref 0–1.3)
MONOCYTES NFR BLD AUTO: 11 %
NEUTROPHILS # BLD AUTO: 2.8 10E3/UL (ref 1.6–8.3)
NEUTROPHILS NFR BLD AUTO: 56 %
NRBC # BLD AUTO: 0 10E3/UL
NRBC BLD AUTO-RTO: 0 /100
PLATELET # BLD AUTO: 149 10E3/UL (ref 150–450)
POTASSIUM SERPL-SCNC: 3.8 MMOL/L (ref 3.4–5.3)
RBC # BLD AUTO: 3.41 10E6/UL (ref 3.8–5.2)
SODIUM SERPL-SCNC: 142 MMOL/L (ref 136–145)
WBC # BLD AUTO: 5.1 10E3/UL (ref 4–11)

## 2022-10-11 PROCEDURE — 36415 COLL VENOUS BLD VENIPUNCTURE: CPT | Performed by: INTERNAL MEDICINE

## 2022-10-11 PROCEDURE — C9113 INJ PANTOPRAZOLE SODIUM, VIA: HCPCS | Performed by: INTERNAL MEDICINE

## 2022-10-11 PROCEDURE — 83735 ASSAY OF MAGNESIUM: CPT | Performed by: INTERNAL MEDICINE

## 2022-10-11 PROCEDURE — 80048 BASIC METABOLIC PNL TOTAL CA: CPT | Performed by: INTERNAL MEDICINE

## 2022-10-11 PROCEDURE — 250N000013 HC RX MED GY IP 250 OP 250 PS 637: Performed by: INTERNAL MEDICINE

## 2022-10-11 PROCEDURE — 258N000003 HC RX IP 258 OP 636: Performed by: INTERNAL MEDICINE

## 2022-10-11 PROCEDURE — 85004 AUTOMATED DIFF WBC COUNT: CPT | Performed by: INTERNAL MEDICINE

## 2022-10-11 PROCEDURE — 120N000001 HC R&B MED SURG/OB

## 2022-10-11 PROCEDURE — 99233 SBSQ HOSP IP/OBS HIGH 50: CPT | Performed by: INTERNAL MEDICINE

## 2022-10-11 PROCEDURE — 250N000011 HC RX IP 250 OP 636: Performed by: INTERNAL MEDICINE

## 2022-10-11 RX ORDER — SIMVASTATIN 20 MG
20 TABLET ORAL AT BEDTIME
Status: DISCONTINUED | OUTPATIENT
Start: 2022-10-11 | End: 2022-10-13 | Stop reason: HOSPADM

## 2022-10-11 RX ORDER — VALSARTAN 40 MG/1
80 TABLET ORAL DAILY
Status: DISCONTINUED | OUTPATIENT
Start: 2022-10-11 | End: 2022-10-13 | Stop reason: HOSPADM

## 2022-10-11 RX ADMIN — SIMVASTATIN 20 MG: 20 TABLET, FILM COATED ORAL at 21:24

## 2022-10-11 RX ADMIN — GABAPENTIN 400 MG: 400 CAPSULE ORAL at 17:14

## 2022-10-11 RX ADMIN — INSULIN ASPART 1 UNITS: 100 INJECTION, SOLUTION INTRAVENOUS; SUBCUTANEOUS at 04:27

## 2022-10-11 RX ADMIN — PANTOPRAZOLE SODIUM 40 MG: 40 INJECTION, POWDER, FOR SOLUTION INTRAVENOUS at 21:25

## 2022-10-11 RX ADMIN — GABAPENTIN 100 MG: 100 CAPSULE ORAL at 08:35

## 2022-10-11 RX ADMIN — PANTOPRAZOLE SODIUM 40 MG: 40 INJECTION, POWDER, FOR SOLUTION INTRAVENOUS at 08:34

## 2022-10-11 RX ADMIN — GABAPENTIN 100 MG: 100 CAPSULE ORAL at 21:24

## 2022-10-11 RX ADMIN — METOPROLOL SUCCINATE 50 MG: 50 TABLET, EXTENDED RELEASE ORAL at 17:15

## 2022-10-11 RX ADMIN — INSULIN ASPART 1 UNITS: 100 INJECTION, SOLUTION INTRAVENOUS; SUBCUTANEOUS at 08:46

## 2022-10-11 RX ADMIN — METFORMIN HYDROCHLORIDE 500 MG: 500 TABLET, FILM COATED ORAL at 17:25

## 2022-10-11 RX ADMIN — DEXTROSE AND SODIUM CHLORIDE: 5; 900 INJECTION, SOLUTION INTRAVENOUS at 13:30

## 2022-10-11 RX ADMIN — DEXTROSE AND SODIUM CHLORIDE: 5; 900 INJECTION, SOLUTION INTRAVENOUS at 04:30

## 2022-10-11 RX ADMIN — VALSARTAN 80 MG: 40 TABLET, FILM COATED ORAL at 17:25

## 2022-10-11 ASSESSMENT — ACTIVITIES OF DAILY LIVING (ADL)
ADLS_ACUITY_SCORE: 24

## 2022-10-11 NOTE — PLAN OF CARE
Pertinent assessments: Pt A/O x4. VSS. On RA. Denies pain and bloating. Tolerating full liquids. IVF 75 ml/hr. Up SBA. On IV Protonix. Tele SR Bandar 50S-70S. Slept well.  Major Shift Events: Uneventful  Treatment Plan:  Symptom management, monitor tolerance of diet.    Bedside Nurse: Eliana Baxter RN

## 2022-10-11 NOTE — PROGRESS NOTES
[facility]    Medicine Progress Note - Hospitalist Service    Date of Admission:  10/9/2022    Assessment & Plan             Edith Thacker is a 87 year old female with PMH including type 2 diabetes and hypertension who presents with 5 days of upper abdominal/lower chest pain associated with nausea and anorexia.  She has been feeling generally unwell for about 5 days and has had multiple episodes of emesis and essentially is not eating or drinking much of anything in part due to the discomfort that she is feeling.  The symptoms are not exertional.  She denies any apparent melena or blood in her vomit.  Denies fevers or chills.  She denies any urinary changes.     In the emergency room she was afebrile.  Blood pressure was stable but she was found to be in atrial fibrillation with RVR with rates into the 140s.  Lab work-up was notable for acute kidney injury with creatinine of 1.49, up from a baseline of around 1.  Calcium was also elevated at 11.  CBC was grossly normal.  She underwent CT scan which showed no evidence of pulmonary embolism but showed a cluster of small nodular opacities in the left lower lobe as well as distal esophageal and gastric wall thickening.  There is also colonic diverticulosis and thickened endometrial stripe measuring 16 mm concerning for neoplasia.     She converted to sinus rhythm after being given 10 mg of diltiazem as well as IV fluids.  NG tube was also placed in the ER due to some concern regarding possible gastric outlet obstruction.  GI was contacted regarding her concerning esophageal/gastric findings with tentative plan for endoscopy in the next day or so.     Assessment and Plan:    Abdominal and lower chest pain 2/2 GOO from large gastric ulcer   Very much appreciate GI input, underwent EGD showing large gastric ulcer as etiology for GOO  -PPI bid for 2 months will need repeat EGD in 2 months [facility] will arrange  -NG placed and has subsequently been removed.   In the process of advancing diet.  Did well with fulls so next is mechanical soft       Atrial fibrillation with RVR: Has since converted to sinus rhythm.  This is a new diagnosis for her.  My high suspicion is this is provoked by #1 above.  She was initially started on a heparin drip in the ER which has been discontinued  -Currently NSR  -back on home metop oral and IV discontinued  --Monitor on telemetry  --trop mildly elevated and flat   --echo wnl with EGF 60-65 %  --Consider further ischemic work-up down the line     Type 2 diabetes: metformin initially held and monitored with ISS with minimal requirements  -resume metformin      History of hypertension: metop resumed but hydrochlorothiazide and valsartan/hctz  held  -cont metop and resume valsartan     Incidental finding of thickened endometrial stripe by CT scan: She and her daughter have been made aware of this earlier by my colleagues and will need to follow-up with gynecology as an outpatient for further evaluation.     Apparent small nodular opacities of the left lower lobe, possible pneumonia:   --Given multiple recent vomiting episodes will cover with Unasyn for possible aspiration pneumonia  -- Check procalcitonin, consider de-escalating antibiotics.  -Procalcitonin negative at 0.03  -May stop antibiotics    Chronic kidney disease stage III   -No evidence of acute kidney injury:   -Creatinine remained baseline.  Care everywhere notes showing creatinine hovers between 1.5- 1.7  -Most recent creatinine stable    Hypercalcemia: Calcium is 11.1.  Likely in part related to dehydration but would also consider occult malignancy.  -resolved with hydration       DVT Prophylaxis: Pneumatic Compression Devices  Code Status: DNR / DNI, discussed.  Discharge Dispo: Admit under inpatient status  .    Diet: Advance Diet as Tolerated: Regular Diet Adult; Regular Diet Adult  - mechanical soft   DVT Prophylaxis: Pneumatic Compression Devices  Branham Catheter: Not  "present  Central Lines: None  Cardiac Monitoring: ACTIVE order. Indication: Tachyarrhythmias, acute (48 hours)  Code Status: No CPR- Do NOT Intubate      Disposition Plan     Expected Discharge Date: Anticipating that she will be requiring inpatient care at least in the next 1-2 more days        The patient's care was discussed with the Patient and Patient's Family.    Mili Flynn MD  Hospitalist Service  [facility]                     # Overweight: Estimated body mass index is 28.44 kg/m  as calculated from the following:    Height as of this encounter: 1.549 m (5' 1\").    Weight as of this encounter: 68.3 kg (150 lb 8 oz).        ______________________________________________________________________    Interval History   I assume service care today.  Seen and examined.  Chart reviewed.  Family updated as one of her daughters present at bedside during my encounter. She is feeling well and tolerated full liquids without difficulty although not eating much.  No cp or sob. No n/v.    Data reviewed today: I reviewed all medications, new labs and imaging results over the last 24 hours. I personally reviewed .Spoke with telemetry monitoring and remained NSR.  No recurrent atrial fibrillation    Physical Exam   Vital Signs: Temp: 97.8  F (36.6  C) Temp src: Oral BP: (!) 146/45 Pulse: 56   Resp: 20 SpO2: 97 % O2 Device: None (Room air)    Weight: 150 lbs 8 oz  HEENT; Atraumatic, normocephalic, pinkish conjuctiva, pupils bilateral reactive   NG tube present  Skin: warm and moist, no rashes    Lungs: equal chest expansion, clear to auscultation, no wheezes, no stridor, no crackles,   Heart: normal rate, normal rhythm, no rubs or gallops.   Abdomen: normal bowel sounds, no tenderness, no peritoneal signs, no guarding  Extremities: no deformities, no edema   Absence of left forearm  Neuro; follow commands, alert and oriented x3, spontaneous speech, coherent, moves all extremities spontaneously  Psych; " no hallucination, euthymic mood, not agitated        Data   Recent Labs   Lab 10/11/22  1233 10/11/22  1211 10/11/22  0928 10/11/22  0708 10/10/22  0809 10/10/22  0707 10/09/22  1607 10/09/22  0512   WBC  --   --   --  5.1  --   --   --  10.6   HGB  --   --   --  10.1*  --   --   --  13.2   MCV  --   --   --  95  --   --   --  92   PLT  --   --   --  149*  --   --   --  215   NA  --   --   --  142  --  145  --  140   POTASSIUM  --   --   --  3.8  --  4.2  --  3.7   CHLORIDE  --   --   --  107  --  104  --  96*   CO2  --   --   --  29  --  35*  --  32*   BUN  --   --   --  13.5  --  18.3  --  26.0*   CR  --   --   --  1.43*  --  1.56*  --  1.49*   ANIONGAP  --   --   --  6*  --  6*  --  12   LOUISE  --   --   --  8.2*  --  9.1  --  11.1*   * 118* 125* 137*   < > 147*   < > 218*   ALBUMIN  --   --   --   --   --   --   --  3.8   PROTTOTAL  --   --   --   --   --   --   --  7.1   BILITOTAL  --   --   --   --   --   --   --  0.4   ALKPHOS  --   --   --   --   --   --   --  52   ALT  --   --   --   --   --   --   --  13   AST  --   --   --   --   --   --   --  21   LIPASE  --   --   --   --   --   --   --  33    < > = values in this interval not displayed.     No results found for this or any previous visit (from the past 24 hour(s)).  Medications     dextrose 5% and 0.9% NaCl 75 mL/hr at 10/11/22 1330     - MEDICATION INSTRUCTIONS -         gabapentin  100 mg Oral QAM     gabapentin  100 mg Oral At Bedtime     gabapentin  400 mg Oral QPM     insulin aspart  1-7 Units Subcutaneous Q4H     metoprolol succinate ER  50 mg Oral QPM     pantoprazole  40 mg Intravenous BID     sodium chloride (PF)  3 mL Intracatheter Q8H     sodium chloride (PF)  3 mL Intracatheter Q8H

## 2022-10-11 NOTE — PLAN OF CARE
For vital signs and complete assessments, please see documentation flowsheets.     Pertinent assessments: Pt AO x4, elevated BP on RA. Denies pain. Tolerating regular diet/mech soft.  Up SBA.  Right infiltrated peripheral IV removed, elevated with cold compress. /118    Major Shift Events: uneventful    Treatment Plan:  Symptom management, monitor tolerance of diet, Tele, BG checks    Bedside Nurse: Nadia Pappas RN

## 2022-10-11 NOTE — PLAN OF CARE
[id number]    Pertinent assessments: Pt A/O x4. VSS. Denies pain and bloating. On room air. NG Out. Tolerating full liquids. IVF 75 Ml/hr. Up SBA. On IV Protonix. Tele SR Bandar 47    Major Shift Events: Uneventful    Treatment Plan:  Symptom management, monitor tolerance of diet.

## 2022-10-11 NOTE — PROGRESS NOTES
"GASTROENTEROLOGY PROGRESS NOTE     SUBJECTIVE:  Denies abdominal pain. Tolerated full liquids last night and having breakfast now. No BM, no n/v. Daughter at bedside.      OBJECTIVE:  BP (!) 144/44 (BP Location: Right arm)   Pulse 54   Temp 97.9  F (36.6  C) (Oral)   Resp 20   Ht 1.549 m (5' 1\")   Wt 68.3 kg (150 lb 8 oz)   SpO2 95%   BMI 28.44 kg/m    Temp (24hrs), Av  F (36.7  C), Min:97.6  F (36.4  C), Max:98.4  F (36.9  C)    Patient Vitals for the past 72 hrs:   Weight   10/10/22 0539 68.3 kg (150 lb 8 oz)   10/09/22 1411 65.9 kg (145 lb 4.8 oz)   10/09/22 0458 52 kg (114 lb 10.2 oz)     No intake or output data in the 24 hours ending 10/11/22 1026     PHYSICAL EXAM  Gen: alert, oriented, NAD  Abd: soft, +BS, NT/ND     Additional Comments:  ROS, FH, SH: See initial GI consult for details.     I have reviewed the patient's new clinical lab results:     Recent Labs   Lab Test 10/11/22  0708 10/09/22  0512 04/20/15  232   WBC 5.1 10.6 6.2   HGB 10.1* 13.2 13.4   MCV 95 92 90   * 215 200   INR  --   --  0.90     Recent Labs   Lab Test 10/11/22  0708 10/10/22  0707 10/09/22  0512   POTASSIUM 3.8 4.2 3.7   CHLORIDE 107 104 96*   CO2 29 35* 32*   BUN 13.5 18.3 26.0*   ANIONGAP 6* 6* 12     Recent Labs   Lab Test 10/09/22  2105 10/09/22  0512 04/20/15  2326 04/20/15  2315   ALBUMIN  --  3.8 3.4  --    BILITOTAL  --  0.4 0.3  --    ALT  --  13 18  --    AST  --  21 11  --    PROTEIN 20*  --   --  Negative   LIPASE  --  33  --   --         Assessment/Plan:  87 year old female with past medical history significant for type 2 diabetes, hypertension, admitted  with upper abdominal pain, chest pain, nausea, vomiting. CT showing thickening of the distal esophagus and stomach, thickened endometrium, left lung nodular opacities.      1. Epigastric pain. S/p EGD 10/10 with large pre-pyloric gastric ulcer without bleeding. Size of ulcer likely causing functional obstruction. Biopsies pending. " Malignancy not completely ruled out but seems less likely based on endoscopic appearance. Initially NG placed but removed for EGD and tolerating full liquids today.   --Will see how she tolerates full liquids this morning. If tolerates ok to advance to mechanical soft.   --If n/v recur, may need NG and post-pyloric tube feeds.   --Can transition to PO PPI BID if continues to tolerate diet.   --Repeat EGD in 2 months to assess for healing. Our office will arrange.     Will sign off, please call with questions.      2. Afib with RVR. Resolved. Not on heparin.       3. Lung nodular opacities. Started on IV Unasyn for possible PNA. Now off.     Reviewed with Dr. Lopez.     Sindhu Duran, PAC  [facility] ([facility])

## 2022-10-12 ENCOUNTER — APPOINTMENT (OUTPATIENT)
Dept: GENERAL RADIOLOGY | Facility: CLINIC | Age: 87
DRG: 380 | End: 2022-10-12
Attending: INTERNAL MEDICINE
Payer: MEDICARE

## 2022-10-12 LAB
ANION GAP SERPL CALCULATED.3IONS-SCNC: 8 MMOL/L (ref 7–15)
BUN SERPL-MCNC: 14.6 MG/DL (ref 8–23)
CALCIUM SERPL-MCNC: 8 MG/DL (ref 8.8–10.2)
CHLORIDE SERPL-SCNC: 109 MMOL/L (ref 98–107)
CREAT SERPL-MCNC: 1.37 MG/DL (ref 0.51–0.95)
DEPRECATED HCO3 PLAS-SCNC: 25 MMOL/L (ref 22–29)
ERYTHROCYTE [DISTWIDTH] IN BLOOD BY AUTOMATED COUNT: 12.4 % (ref 10–15)
GFR SERPL CREATININE-BSD FRML MDRD: 38 ML/MIN/1.73M2
GLUCOSE BLDC GLUCOMTR-MCNC: 101 MG/DL (ref 70–99)
GLUCOSE BLDC GLUCOMTR-MCNC: 119 MG/DL (ref 70–99)
GLUCOSE BLDC GLUCOMTR-MCNC: 130 MG/DL (ref 70–99)
GLUCOSE BLDC GLUCOMTR-MCNC: 130 MG/DL (ref 70–99)
GLUCOSE BLDC GLUCOMTR-MCNC: 144 MG/DL (ref 70–99)
GLUCOSE SERPL-MCNC: 130 MG/DL (ref 70–99)
HCT VFR BLD AUTO: 32.2 % (ref 35–47)
HGB BLD-MCNC: 10 G/DL (ref 11.7–15.7)
MAGNESIUM SERPL-MCNC: 1.9 MG/DL (ref 1.7–2.3)
MCH RBC QN AUTO: 29.6 PG (ref 26.5–33)
MCHC RBC AUTO-ENTMCNC: 31.1 G/DL (ref 31.5–36.5)
MCV RBC AUTO: 95 FL (ref 78–100)
PATH REPORT.COMMENTS IMP SPEC: NORMAL
PATH REPORT.COMMENTS IMP SPEC: NORMAL
PATH REPORT.FINAL DX SPEC: NORMAL
PATH REPORT.GROSS SPEC: NORMAL
PATH REPORT.MICROSCOPIC SPEC OTHER STN: NORMAL
PATH REPORT.RELEVANT HX SPEC: NORMAL
PHOTO IMAGE: NORMAL
PLATELET # BLD AUTO: 153 10E3/UL (ref 150–450)
POTASSIUM SERPL-SCNC: 3.9 MMOL/L (ref 3.4–5.3)
RBC # BLD AUTO: 3.38 10E6/UL (ref 3.8–5.2)
SODIUM SERPL-SCNC: 142 MMOL/L (ref 136–145)
WBC # BLD AUTO: 5.2 10E3/UL (ref 4–11)

## 2022-10-12 PROCEDURE — 88342 IMHCHEM/IMCYTCHM 1ST ANTB: CPT | Mod: 26 | Performed by: PATHOLOGY

## 2022-10-12 PROCEDURE — 99233 SBSQ HOSP IP/OBS HIGH 50: CPT | Performed by: INTERNAL MEDICINE

## 2022-10-12 PROCEDURE — 88305 TISSUE EXAM BY PATHOLOGIST: CPT | Mod: 26 | Performed by: PATHOLOGY

## 2022-10-12 PROCEDURE — 258N000003 HC RX IP 258 OP 636: Performed by: INTERNAL MEDICINE

## 2022-10-12 PROCEDURE — 85027 COMPLETE CBC AUTOMATED: CPT | Performed by: INTERNAL MEDICINE

## 2022-10-12 PROCEDURE — 36415 COLL VENOUS BLD VENIPUNCTURE: CPT | Performed by: INTERNAL MEDICINE

## 2022-10-12 PROCEDURE — C9113 INJ PANTOPRAZOLE SODIUM, VIA: HCPCS | Performed by: INTERNAL MEDICINE

## 2022-10-12 PROCEDURE — 250N000013 HC RX MED GY IP 250 OP 250 PS 637: Performed by: INTERNAL MEDICINE

## 2022-10-12 PROCEDURE — 88341 IMHCHEM/IMCYTCHM EA ADD ANTB: CPT | Mod: 26 | Performed by: PATHOLOGY

## 2022-10-12 PROCEDURE — 83735 ASSAY OF MAGNESIUM: CPT | Performed by: INTERNAL MEDICINE

## 2022-10-12 PROCEDURE — 250N000011 HC RX IP 250 OP 636: Performed by: INTERNAL MEDICINE

## 2022-10-12 PROCEDURE — 80048 BASIC METABOLIC PNL TOTAL CA: CPT | Performed by: INTERNAL MEDICINE

## 2022-10-12 PROCEDURE — 120N000001 HC R&B MED SURG/OB

## 2022-10-12 PROCEDURE — 74019 RADEX ABDOMEN 2 VIEWS: CPT

## 2022-10-12 RX ADMIN — PANTOPRAZOLE SODIUM 40 MG: 40 INJECTION, POWDER, FOR SOLUTION INTRAVENOUS at 21:48

## 2022-10-12 RX ADMIN — GABAPENTIN 100 MG: 100 CAPSULE ORAL at 08:52

## 2022-10-12 RX ADMIN — VALSARTAN 80 MG: 40 TABLET, FILM COATED ORAL at 08:52

## 2022-10-12 RX ADMIN — SIMVASTATIN 20 MG: 20 TABLET, FILM COATED ORAL at 21:49

## 2022-10-12 RX ADMIN — DEXTROSE AND SODIUM CHLORIDE: 5; 900 INJECTION, SOLUTION INTRAVENOUS at 02:52

## 2022-10-12 RX ADMIN — METFORMIN HYDROCHLORIDE 500 MG: 500 TABLET, FILM COATED ORAL at 17:03

## 2022-10-12 RX ADMIN — PANTOPRAZOLE SODIUM 40 MG: 40 INJECTION, POWDER, FOR SOLUTION INTRAVENOUS at 08:52

## 2022-10-12 RX ADMIN — GABAPENTIN 100 MG: 100 CAPSULE ORAL at 21:49

## 2022-10-12 RX ADMIN — METOPROLOL SUCCINATE 50 MG: 50 TABLET, EXTENDED RELEASE ORAL at 17:03

## 2022-10-12 RX ADMIN — GABAPENTIN 400 MG: 400 CAPSULE ORAL at 17:03

## 2022-10-12 ASSESSMENT — ACTIVITIES OF DAILY LIVING (ADL)
ADLS_ACUITY_SCORE: 24

## 2022-10-12 NOTE — PLAN OF CARE
For vital signs and complete assessments, please see documentation flowsheets.     Pertinent assessments: Pt A/Ox4. VSS. Denies pain, nausea and SOB. Up SBA. Tele- SB. ,130. Having some abdominal discomfort in AM and now resolved.    Major Shift Events: X ray     Treatment Plan: Rocephin, symptom management, BG checks, Tele    Bedside Nurse: Nadia Pappas RN

## 2022-10-12 NOTE — PROGRESS NOTES
[facility]    Medicine Progress Note - Hospitalist Service    Date of Admission:  10/9/2022    Assessment & Plan             Edith Thacker is a 87 year old female with PMH including type 2 diabetes and hypertension who presents with 5 days of upper abdominal/lower chest pain associated with nausea and anorexia.  She has been feeling generally unwell for about 5 days and has had multiple episodes of emesis and essentially is not eating or drinking much of anything in part due to the discomfort that she is feeling.  The symptoms are not exertional.  She denies any apparent melena or blood in her vomit.  Denies fevers or chills.  She denies any urinary changes.     In the emergency room she was afebrile.  Blood pressure was stable but she was found to be in atrial fibrillation with RVR with rates into the 140s.  Lab work-up was notable for acute kidney injury with creatinine of 1.49, up from a baseline of around 1.  Calcium was also elevated at 11.  CBC was grossly normal.  She underwent CT scan which showed no evidence of pulmonary embolism but showed a cluster of small nodular opacities in the left lower lobe as well as distal esophageal and gastric wall thickening.  There is also colonic diverticulosis and thickened endometrial stripe measuring 16 mm concerning for neoplasia.     She converted to sinus rhythm after being given 10 mg of diltiazem as well as IV fluids.  NG tube was also placed in the ER due to some concern regarding possible gastric outlet obstruction.  GI was contacted regarding her concerning esophageal/gastric findings with tentative plan for endoscopy in the next day or so.     Assessment and Plan:    Abdominal and lower chest pain 2/2 GOO from large gastric ulcer   Very much appreciate GI input, underwent EGD showing large gastric ulcer as etiology for GOO  -PPI bid for 2 months will need repeat EGD in 2 months [facility] will arrange  -NG placed and has subsequently been removed.   In the process of advancing diet.  Did well with fuls-advanced to mech soft and with some bloating but no n/v.  Follow-up AXR 10/12 wnl with normal BG pattern       Atrial fibrillation with RVR: Has since converted to sinus rhythm.  This is a new diagnosis for her.  My high suspicion is this is provoked by #1 above.  She was initially started on a heparin drip in the ER which has been discontinued  -Currently NSR  -back on home metop oral and IV discontinued  --Monitor on telemetry  --trop mildly elevated and flat   --echo wnl with EGF 60-65 %  --Consider further ischemic work-up down the line     Type 2 diabetes: metformin initially held and monitored with ISS with minimal requirements  -resumed metformin with good control       History of hypertension: metop resumed but hydrochlorothiazide and valsartan/hctz  held  -cont metop and resume valsartan     Incidental finding of thickened endometrial stripe by CT scan: She and her daughter have been made aware of this earlier by my colleagues and will need to follow-up with gynecology as an outpatient for further evaluation.     Apparent small nodular opacities of the left lower lobe, possible pneumonia:   --Given multiple recent vomiting episodes will cover with Unasyn for possible aspiration pneumonia  --procal low and no e/o ongoing infection so abx discontinued after 2 days    Chronic kidney disease stage III   -No evidence of acute kidney injury:   -Creatinine remained baseline.  Care everywhere notes showing creatinine hovers between 1.5- 1.7  -Most recent creatinine stable    Hypercalcemia: Calcium is 11.1.  Likely in part related to dehydration but would also consider occult malignancy.  -resolved with hydration       DVT Prophylaxis: Pneumatic Compression Devices  Code Status: DNR / DNI, discussed.  Discharge Dispo: Admit under inpatient status  .    Diet: Clear Liquid Diet  Full Liquid Diet  - mechanical soft   DVT Prophylaxis: Pneumatic Compression  "Devices  Branham Catheter: Not present  Central Lines: None  Cardiac Monitoring: ACTIVE order. Indication: Tachyarrhythmias, acute (48 hours)  Code Status: No CPR- Do NOT Intubate      Disposition Plan     Expected Discharge Date: Anticipating discharge tomorrow provided able to take in reasonable po  The patient's care was discussed with the Patient and Patient's Family.    Mili Flynn MD  Hospitalist Service  [facility]                     # Overweight: Estimated body mass index is 28.44 kg/m  as calculated from the following:    Height as of this encounter: 1.549 m (5' 1\").    Weight as of this encounter: 68.3 kg (150 lb 8 oz).        ______________________________________________________________________    Interval History   I assume service care today.  Seen and examined.  Chart reviewed.  Family updated as one of her daughters present at bedside during my encounter. She is feeling well and tolerated full liquids without difficulty although not eating much.  No cp or sob. No n/v.    Data reviewed today: I reviewed all medications, new labs and imaging results over the last 24 hours. I personally reviewed .Spoke with telemetry monitoring and remained NSR.  No recurrent atrial fibrillation    Physical Exam   Vital Signs: Temp: 98.7  F (37.1  C) Temp src: Oral BP: (!) 148/48 Pulse: 55   Resp: 20 SpO2: 95 % O2 Device: None (Room air)    Weight: 150 lbs 8 oz  HEENT; Atraumatic, normocephalic, pinkish conjuctiva, pupils bilateral reactive   NG tube present  Skin: warm and moist, no rashes    Lungs: equal chest expansion, clear to auscultation, no wheezes, no stridor, no crackles,   Heart: normal rate, normal rhythm, no rubs or gallops.   Abdomen: normal bowel sounds, no tenderness, no peritoneal signs, no guarding  Extremities: no deformities, no edema   Absence of left forearm  Neuro; follow commands, alert and oriented x3, spontaneous speech, coherent, moves all extremities " spontaneously  Psych; no hallucination, euthymic mood, not agitated        Data   Recent Labs   Lab 10/12/22  1214 10/12/22  0834 10/12/22  0631 10/11/22  0928 10/11/22  0708 10/10/22  0809 10/10/22  0707 10/09/22  1607 10/09/22  0512   WBC  --   --  5.2  --  5.1  --   --   --  10.6   HGB  --   --  10.0*  --  10.1*  --   --   --  13.2   MCV  --   --  95  --  95  --   --   --  92   PLT  --   --  153  --  149*  --   --   --  215   NA  --   --  142  --  142  --  145  --  140   POTASSIUM  --   --  3.9  --  3.8  --  4.2  --  3.7   CHLORIDE  --   --  109*  --  107  --  104  --  96*   CO2  --   --  25  --  29  --  35*  --  32*   BUN  --   --  14.6  --  13.5  --  18.3  --  26.0*   CR  --   --  1.37*  --  1.43*  --  1.56*  --  1.49*   ANIONGAP  --   --  8  --  6*  --  6*  --  12   LOUISE  --   --  8.0*  --  8.2*  --  9.1  --  11.1*   * 144* 130*   < > 137*   < > 147*   < > 218*   ALBUMIN  --   --   --   --   --   --   --   --  3.8   PROTTOTAL  --   --   --   --   --   --   --   --  7.1   BILITOTAL  --   --   --   --   --   --   --   --  0.4   ALKPHOS  --   --   --   --   --   --   --   --  52   ALT  --   --   --   --   --   --   --   --  13   AST  --   --   --   --   --   --   --   --  21   LIPASE  --   --   --   --   --   --   --   --  33    < > = values in this interval not displayed.     Recent Results (from the past 24 hour(s))   XR Abdomen 2 Views    Narrative    ABDOMEN TWO VIEWS October 12, 2022 11:01 AM     HISTORY: Abdominal distention.    COMPARISON: 10/9/2022.      Impression    IMPRESSION: Enteric tube has been removed. Bowel gas pattern is within  normal limits. No evidence for bowel obstruction. No free  intraperitoneal air. Vascular calcifications in the left upper  quadrant.    LALO DOMINGUEZ MD         SYSTEM ID:  [id number]     Medications     - MEDICATION INSTRUCTIONS -         gabapentin  100 mg Oral QAM     gabapentin  100 mg Oral At Bedtime     gabapentin  400 mg Oral QPM     insulin aspart  1-7  Units Subcutaneous 4x Daily AC & HS     metFORMIN  500 mg Oral Daily with supper     metoprolol succinate ER  50 mg Oral QPM     pantoprazole  40 mg Intravenous BID     simvastatin  20 mg Oral At Bedtime     sodium chloride (PF)  3 mL Intracatheter Q8H     sodium chloride (PF)  3 mL Intracatheter Q8H     valsartan  80 mg Oral Daily

## 2022-10-12 NOTE — PLAN OF CARE
Goal Outcome Evaluation:      To Do:  End of Shift Summary  For vital signs and complete assessments, please see documentation flowsheets.     Pertinent assessments: Pt A/Ox4. VSS. Denies pain, nausea and SOB. Up SBA. Tele- SB. ,130. Tele-SB 50's  Major Shift Events: uneventful  Treatment Plan: IVF, Rocephin, symptom management, BG checks  Bedside Nurse: Mili Purvis RN                   Pt has yeast infection. Miconazole sent to pharmacy; negative BV and trich. Urine culture WNL; gc/chlamydia negative

## 2022-10-13 ENCOUNTER — APPOINTMENT (OUTPATIENT)
Dept: CARDIOLOGY | Facility: CLINIC | Age: 87
DRG: 380 | End: 2022-10-13
Attending: INTERNAL MEDICINE
Payer: MEDICARE

## 2022-10-13 VITALS
DIASTOLIC BLOOD PRESSURE: 57 MMHG | WEIGHT: 150.5 LBS | OXYGEN SATURATION: 95 % | HEART RATE: 56 BPM | RESPIRATION RATE: 20 BRPM | BODY MASS INDEX: 28.42 KG/M2 | TEMPERATURE: 98.6 F | HEIGHT: 61 IN | SYSTOLIC BLOOD PRESSURE: 184 MMHG

## 2022-10-13 LAB
GLUCOSE BLDC GLUCOMTR-MCNC: 107 MG/DL (ref 70–99)
GLUCOSE BLDC GLUCOMTR-MCNC: 126 MG/DL (ref 70–99)

## 2022-10-13 PROCEDURE — C9113 INJ PANTOPRAZOLE SODIUM, VIA: HCPCS | Performed by: INTERNAL MEDICINE

## 2022-10-13 PROCEDURE — 250N000013 HC RX MED GY IP 250 OP 250 PS 637: Performed by: INTERNAL MEDICINE

## 2022-10-13 PROCEDURE — 93248 EXT ECG>7D<15D REV&INTERPJ: CPT | Performed by: INTERNAL MEDICINE

## 2022-10-13 PROCEDURE — 250N000011 HC RX IP 250 OP 636: Performed by: INTERNAL MEDICINE

## 2022-10-13 PROCEDURE — 93246 EXT ECG>7D<15D RECORDING: CPT

## 2022-10-13 PROCEDURE — 99239 HOSP IP/OBS DSCHRG MGMT >30: CPT | Performed by: INTERNAL MEDICINE

## 2022-10-13 RX ORDER — PANTOPRAZOLE SODIUM 40 MG/1
40 TABLET, DELAYED RELEASE ORAL 2 TIMES DAILY
Qty: 60 TABLET | Refills: 1 | Status: ON HOLD | OUTPATIENT
Start: 2022-10-13 | End: 2023-01-30

## 2022-10-13 RX ADMIN — GABAPENTIN 100 MG: 100 CAPSULE ORAL at 08:36

## 2022-10-13 RX ADMIN — VALSARTAN 80 MG: 40 TABLET, FILM COATED ORAL at 08:35

## 2022-10-13 RX ADMIN — PANTOPRAZOLE SODIUM 40 MG: 40 INJECTION, POWDER, FOR SOLUTION INTRAVENOUS at 08:36

## 2022-10-13 ASSESSMENT — ACTIVITIES OF DAILY LIVING (ADL)
ADLS_ACUITY_SCORE: 24
ADLS_ACUITY_SCORE: 22
ADLS_ACUITY_SCORE: 24
ADLS_ACUITY_SCORE: 22
ADLS_ACUITY_SCORE: 24

## 2022-10-13 NOTE — DISCHARGE SUMMARY
Discharge Summary  Hospitalist Service    Edith Thacker MRN# [id number]   YOB: 1935 Age: 87 year old     Date of Admission:  10/9/2022  Date of Discharge:  10/13/2022  Admitting Physician:  Grant Morin MD  Discharge Physician: Mili Flynn MD  Discharging Service: Hospitalist Service     Primary Provider: Tori Dawkins  Primary Care Physician Phone Number: [phone]         Discharge Diagnoses/Problem Oriented Hospital Course (Providers):      Edith Thacker was admitted on 10/9/2022 by Grant Morin MD and I would refer you to their history and physical.  The following problems were addressed during her hospitalization:    Gastric outlet obstruction 2/2 large gastric ulcer  Transient AF with RVR  T2dm  htn  Thickened endometrial strip on admission CT scan   ? pna  CKD stage 3  Transient hypercalcemia     Edith Thacker is a 87 year old female with PMH including type 2 diabetes and hypertension who presents with 5 days of upper abdominal/lower chest pain associated with nausea and anorexia.  She has been feeling generally unwell for about 5 days and has had multiple episodes of emesis and essentially is not eating or drinking much of anything in part due to the discomfort that she is feeling.  The symptoms are not exertional.  She denies any apparent melena or blood in her vomit.  Denies fevers or chills.  She denies any urinary changes.     In the emergency room she was afebrile.  Blood pressure was stable but she was found to be in atrial fibrillation with RVR with rates into the 140s.  Lab work-up was notable for acute kidney injury with creatinine of 1.49, up from a baseline of around 1.  Calcium was also elevated at 11.  CBC was grossly normal.  She underwent CT scan which showed no evidence of pulmonary embolism but showed a cluster of small nodular opacities in the left lower lobe as well as distal esophageal and gastric wall thickening.  There is also colonic  diverticulosis and thickened endometrial stripe measuring 16 mm concerning for neoplasia.     She converted to sinus rhythm after being given 10 mg of diltiazem as well as IV fluids.  NG tube was also placed in the ER due to some concern regarding possible gastric outlet obstruction.  GI was contacted regarding her concerning esophageal/gastric findings with tentative plan for endoscopy in the next day or so.     Assessment and Plan:     Abdominal and lower chest pain 2/2 GOO from large gastric ulcer   Very much appreciate GI input, underwent EGD showing large gastric ulcer as etiology for GOO  -PPI bid for 2 months will need repeat EGD in 2 months [facility] will arrange  -NG placed and has subsequently been removed.  In the process of advancing diet.  Did well with fuls-advanced to mech soft and with some bloating but no n/v.  Follow-up AXR 10/12 wnl with normal BG pattern  -discharge on low fiber/mechanical soft-small frequent meals for one week then gradually re-introduce normal diet        Atrial fibrillation with RVR: Has since converted to sinus rhythm.  This is a new diagnosis for her.  My high suspicion is this is provoked by #1 above.  She was initially started on a heparin drip in the ER which has been discontinued  -Currently NSR  -back on home metop oral and IV discontinued  --Monitor on telemetry  --trop mildly elevated and flat 37->37  --echo wnl with EF 60-65 %  --Consider further ischemic work-up down the line     Type 2 diabetes: metformin initially held and monitored with ISS with minimal requirements  -resumed metformin with good control       History of hypertension: pta on metop/valsartan/hctz  -resumed BB then valsartan and BPs elevated, hydrochlorothiazide resumed at discharge     Incidental finding of thickened endometrial stripe by CT scan: She and her daughter have been made aware of this earlier by my colleagues and will need to follow-up with gynecology as an outpatient for further  evaluation.     Apparent small nodular opacities of the left lower lobe, possible pneumonia:   --Given multiple recent vomiting episodes initially covered with Unasyn for possible aspiration pneumonia  --procal low and no e/o ongoing infection so abx discontinued after 2 days     Chronic kidney disease stage III   -No evidence of acute kidney injury:   -Creatinine remained baseline.  Care everywhere notes showing creatinine hovers between 1.5- 1.7  -Most recent creatinine stable     Hypercalcemia: Calcium is 11.1.  Likely in part related to dehydration but would also consider occult malignancy.  -resolved with hydration        DVT Prophylaxis: Pneumatic Compression Devices  Code Status: DNR / DNI, discussed.  Discharge Dispo: Admit under inpatient status  .     Diet: low fiber  Full Liquid Diet  - mechanical soft/low fiber- small frequent meals  DVT Prophylaxis: Pneumatic Compression Devices  Branham Catheter: Not present         Code Status:      DNR / DNI        Brief Hospital Stay Summary Sent Home With Patient in AVS:          Reason for your hospital stay      Large gastric ulcer complicated by gastric outlet obstruction                      Important Results:        As noted below         Pending Results:        Unresulted Labs Ordered in the Past 30 Days of this Admission     No orders found from 9/9/2022 to 10/10/2022.            Discharge Instructions and Follow-Up:      Follow-up Appointments     Follow-up and recommended labs and tests       Follow-up with your PCP in 2-3 weeks for recheck after hospitalization    On your admission CT scan you had an incidental finding of a thickened   inner uterus-you can discuss follow-up with your PCP     You had transient atrial fibrillation which was likely precipitated by   your acute medical process.  But to be sure you will be discharged with a   cardiac monitor for 2 weeks to ensure that this isn't a rhythm that your   occasionally have without your  knowledge.    On presentation you had some chest pain that I think was due to your   obstructed stomach.  But given age and chronic medical problems you may   benefit from an outpatient stress test. Please discuss this with your PCP                 Discharge Disposition:        Discharged to home         Discharge Medications:        Current Discharge Medication List      START taking these medications    Details   pantoprazole (PROTONIX) 40 MG EC tablet Take 1 tablet (40 mg) by mouth 2 times daily  Qty: 60 tablet, Refills: 1    Associated Diagnoses: Acute gastritis without hemorrhage, unspecified gastritis type         CONTINUE these medications which have NOT CHANGED    Details   acetaminophen (TYLENOL) 500 MG tablet Take 2 tablets (1,000 mg) by mouth every 6 hours as needed for mild pain      !! gabapentin (NEURONTIN) 100 MG capsule Take 4 capsules (400 mg) by mouth every evening      !! gabapentin (NEURONTIN) 100 MG capsule Take 1 capsule (100 mg) by mouth At Bedtime      !! gabapentin (NEURONTIN) 100 MG capsule Take 1 capsule (100 mg) by mouth every morning      hydrochlorothiazide (HYDRODIURIL) 25 MG tablet Take 1 tablet (25 mg) by mouth daily      metFORMIN (GLUCOPHAGE) 500 MG tablet Take 1 tablet (500 mg) by mouth daily (with dinner)      metoprolol succinate ER (TOPROL XL) 50 MG 24 hr tablet Take 1 tablet (50 mg) by mouth every evening      Omega-3 Fatty Acids (OMEGA-3 FISH OIL PO) Take 1 capsule by mouth every morning      simvastatin (ZOCOR) 20 MG tablet Take 1 tablet (20 mg) by mouth At Bedtime      triamcinolone (KENALOG) 0.5 % cream Apply topically 2 times daily as needed      valsartan (DIOVAN) 80 MG tablet Take 1 tablet (80 mg) by mouth daily      Vitamin D (Cholecalciferol) 50 MCG (2000 UT) CAPS Take 2,000 Units by mouth daily       !! - Potential duplicate medications found. Please discuss with provider.            Allergies:         Allergies   Allergen Reactions     Amlodipine Swelling     3+ LE  "edema on amlodipine 5 mg.           Consultations This Hospital Stay:        [facility]         Condition and Physical on Discharge:        Discharge condition: Stable   Vitals: Blood pressure (!) 184/57, pulse 56, temperature 98.6  F (37  C), temperature source Oral, resp. rate 20, height 1.549 m (5' 1\"), weight 68.3 kg (150 lb 8 oz), SpO2 95 %.     Constitutional: Pleasant nad looks stated age head ncat sclera clear      Lungs: ctab nl effort   Cardiovascular: rrr no mrg tele NSR/SB   Abdomen: bs active but only moderately so mod distension but s/nt   Skin: Warm and dry no cyanosis or clubbing    Other: Alert and oriented affect appropriate smith          Discharge Time:      Greater than 30 minutes.        Image Results From This Hospital Stay (For Non-EPIC Providers):        Results for orders placed or performed during the hospital encounter of 10/09/22   POC US ECHO LIMITED    Impression    PROCEDURE NOTE --> Emergency Bedside Ultrasound    Procedure Name: Bedside Cardiac Ultrasound    Preformed by: Kevon Richmond MD    Indication - Chest Pain, Shortness of Breath, Palpitations     Probe: Phased array probe   Curvilinear parobe    Windows -    PSLA   4 Chamber   Bilateral lungs windows    Findings -   Contractility - grossly normal LV systolic function   Pericardial Fluid - none   Chamber Size -  preserved LV:RV ratio.  biatrial dilation,mild   Pleural effusion - not present   Pneumothorax - not present    Lung Parenchyma- no B lines present     Impression - Normal LV sys fxn, no e/o pulm edema.  No large pleural or pericardial effusion.     Images saved to PACS protocol     CT Chest (PE) Abdomen Pelvis w Contrast    Narrative    EXAM: CT CHEST PE, ABDOMEN AND PELVIS WITH CONTRAST  LOCATION: [facility]  DATE/TIME: 10/09/2022, 7:53 AM    INDICATION: Dyspnea, chest pain, new A-fib, epigastric pain.  COMPARISON: CT abdomen and pelvis on 04/20/2015.  TECHNIQUE: CT chest pulmonary angiogram and " routine CT abdomen pelvis with IV contrast. Arterial phase through the chest and venous phase through the abdomen and pelvis. Multiplanar reformats and MIP reconstructions were performed. Dose reduction   techniques were used.   CONTRAST: 51 mL Isovue 370.    FINDINGS:  ANGIOGRAM CHEST: No evidence of pulmonary embolism.    LUNGS AND PLEURA: No pleural effusion or pneumothorax. Cluster of small nodular pulmonary opacities in the lateral aspect of the left lower lobe (series 7 image 128), could be infectious. A few scattered pulmonary nodules including 2 mm left upper lobe   nodule (series 7 image 100).    MEDIASTINUM/AXILLAE: No cardiomegaly or significant pericardial effusion. Distal esophageal wall thickening with small hiatal hernia.    CORONARY ARTERY CALCIFICATION: Mild.    HEPATOBILIARY: Multiple hypodense foci in the liver, some can be characterized as liver cysts including 1.2 cm in hepatic segment 2 (series 11 image 41) while multiple others are subcentimeter and too small to characterize. The gallbladder is   unremarkable.    PANCREAS: No main pancreatic ductal dilatation or definite solid pancreatic mass.    SPLEEN: No splenomegaly.    ADRENAL GLANDS: No adrenal nodules.    KIDNEYS/BLADDER: No radiodense kidney/ureteral stones or hydronephrosis in either kidney.    BOWEL: No abnormally dilated bowel loops. The appendix is visualized and appears normal. Colonic diverticulosis without CT evidence of acute diverticulitis. Mild gastric wall thickening of the distal stomach (series 11 image 81).    PERITONEUM: No significant free fluid in the abdomen or pelvis.    LYMPH NODES: No significant abdominopelvic lymphadenopathy.    VASCULATURE: Moderate atherosclerotic vascular calcification of the abdominal aorta and iliac vessels.    PELVIC ORGANS: Thickened endometrial stripe measuring 16 mm in thickness, worrisome for endometrial hyperplasia/neoplasia.    MUSCULOSKELETAL: Multilevel degenerative changes of the  spine. No suspicious osseous lesion.      Impression    IMPRESSION:  1.  No evidence of pulmonary embolism.  2.  Cluster of small nodular opacities in the left lower lobe, could be infectious.  3.  Distal esophageal and gastric wall thickening, nonspecific, can be seen with esophagitis/gastritis or less likely neoplasm. This can be further evaluated with upper endoscopy as clinically warranted.  4.  Colonic diverticulosis without CT evidence of acute diverticulitis.  5.  Thickened endometrial stripe measuring 16 mm, worrisome for endometrial hyperplasia/neoplasia.     Abdomen XR 1 vw    Narrative    EXAM: XR ABDOMEN 1 VIEW  LOCATION: [facility]  DATE/TIME: 10/9/2022 11:32 AM    INDICATION: NG tube placement  COMPARISON: CT 10/09/2022 at 0750 hours       Impression    IMPRESSION: An OG tube sidehole is in the stomach. No distended air-filled loops of small bowel. There is a small amount of stool in the colon. No intraperitoneal free air. There is excreted contrast material in the bladder.    XR Abdomen 2 Views    Narrative    ABDOMEN TWO VIEWS 2022 11:01 AM     HISTORY: Abdominal distention.    COMPARISON: 10/9/2022.      Impression    IMPRESSION: Enteric tube has been removed. Bowel gas pattern is within  normal limits. No evidence for bowel obstruction. No free  intraperitoneal air. Vascular calcifications in the left upper  quadrant.    LALO DOMINGUEZ MD         SYSTEM ID:  [id number]   Echocardiogram Complete     Value    LVEF  60-65%    Narrative    [id number]  [id number]  [id number]  [id number]^DENISE^KAMI^SABRINA     [facility]  Echocardiography Laboratory  [address] [place] [address]     Name: JOCY CASTELLANO  MRN: [id number]  : 1935  Study Date: 10/10/2022 10:15 AM  Age: 87 yrs  Gender: Female  Patient Location: [facility]  Reason For Study: Atrial Fibrillation  Ordering Physician: KAMI WALKER  Referring Physician: Mariza  Tori  Performed By: Deborah Schumacher RDCS     BSA: 1.6 m2  Height: 61 in  Weight: 145 lb  HR: 61  BP: 152/60 mmHg  ______________________________________________________________________________  Procedure  Complete Portable Echo Adult.  ______________________________________________________________________________  Interpretation Summary     Left ventricular systolic function is normal.  The visual ejection fraction is 60-65%.  No regional wall motion abnormalities noted.  The study was technically adequate. There is no comparison study available.  ______________________________________________________________________________  Left Ventricle  The left ventricle is normal in size. There is normal left ventricular wall  thickness. Left ventricular systolic function is normal. The visual ejection  fraction is 60-65%. Left ventricular diastolic function is indeterminate. No  regional wall motion abnormalities noted.     Right Ventricle  The right ventricle is normal size. The right ventricular systolic function is  normal.     Atria  Normal left atrial size. Right atrial size is normal.     Mitral Valve  There is moderate mitral annular calcification. There is mild (1+) mitral  regurgitation.     Tricuspid Valve  There is trace tricuspid regurgitation. The right ventricular systolic  pressure is approximated at 27.5 mmHg plus the right atrial pressure.     Aortic Valve  There is mild trileaflet aortic sclerosis. No aortic stenosis is present.     Pulmonic Valve  The pulmonic valve is not well visualized.     Vessels  The aortic root is normal size.     Pericardium  There is no pericardial effusion.     Rhythm  Sinus rhythm was noted.  ______________________________________________________________________________  MMode/2D Measurements & Calculations  IVSd: 1.1 cm     LVIDd: 4.5 cm  LVIDs: 2.9 cm  LVPWd: 1.0 cm  FS: 35.9 %  LV mass(C)d: 160.6 grams  LV mass(C)dI: 97.5 grams/m2  Ao root diam: 3.1 cm  LA dimension: 3.9 cm  asc  Aorta Diam: 3.0 cm  LA/Ao: 1.3  LA Volume (BP): 50.0 ml  LA Volume Index (BP): 30.3 ml/m2  RWT: 0.45     Doppler Measurements & Calculations  MV E max dario: 82.5 cm/sec  MV A max dario: 99.4 cm/sec  MV E/A: 0.83  MV max P.4 mmHg  MV mean P.0 mmHg  MV V2 VTI: 33.2 cm  MV dec time: 0.18 sec  PA acc time: 0.14 sec  TR max dario: 262.0 cm/sec  TR max P.5 mmHg  E/E' av.2  Lateral E/e': 17.0  Medial E/e': 15.4     ______________________________________________________________________________  Report approved by: Latricia Veliz 10/10/2022 11:51 AM                 Most Recent Lab Results In EPIC (For Non-EPIC Providers):    Most Recent 3 CBC's:  Recent Labs   Lab Test 10/12/22  0631 10/11/22  0708 10/09/22  0512   WBC 5.2 5.1 10.6   HGB 10.0* 10.1* 13.2   MCV 95 95 92    149* 215      Most Recent 3 BMP's:  Recent Labs   Lab Test 10/13/22  1145 10/13/22  0830 10/12/22  2126 10/12/22  0834 10/12/22  0631 10/11/22  0928 10/11/22  0708 10/10/22  0809 10/10/22  0707   NA  --   --   --   --  142  --  142  --  145   POTASSIUM  --   --   --   --  3.9  --  3.8  --  4.2   CHLORIDE  --   --   --   --  109*  --  107  --  104   CO2  --   --   --   --  25  --  29  --  35*   BUN  --   --   --   --  14.6  --  13.5  --  18.3   CR  --   --   --   --  1.37*  --  1.43*  --  1.56*   ANIONGAP  --   --   --   --  8  --  6*  --  6*   LOUISE  --   --   --   --  8.0*  --  8.2*  --  9.1   * 107* 119*   < > 130*   < > 137*   < > 147*    < > = values in this interval not displayed.     Most Recent 2 LFT's:  Recent Labs   Lab Test 10/09/22  0512 04/20/15  2326   AST 21 11   ALT 13 18   ALKPHOS 52 53   BILITOTAL 0.4 0.3     Most Recent TSH, T4 and HgbA1c:   Recent Labs   Lab Test 10/09/22  0512   TSH 3.12   A1C 6.4*

## 2022-10-13 NOTE — PLAN OF CARE
For vital signs and complete assessments, please see documentation flowsheets.     Pertinent assessments: Pt is alert and oriented x4. VSS, on RA. Denies pain and N/V.  Tolerating low fiber diet.  Up SBA. On Tele- SB.  and 126.    Major Shift Events: possible discharge today    Treatment Plan: Rocephin, symptom management, BG checks, Tele    Bedside Nurse: Nadia Pappas RN

## 2022-10-13 NOTE — PLAN OF CARE
End of Shift Summary:  For vital signs and complete assessments, please see documentation flowsheets.     Pertinent assessments: Pt is alert and oriented x4. VS at baseline, denies pain and SOB. Up SBA. Tele- SR 70 as per tele tech.     Major Shift Events: None    Treatment Plan: Rocephin, symptom management, BG checks, cardiac monitoring.    Bedside Nurse: Mallika Suarez RN

## 2022-10-13 NOTE — PLAN OF CARE
Goal Outcome Evaluation:     End of Shift Summary  For vital signs and complete assessments, please see documentation flowsheets.     Pertinent assessments: Pt is alert and oriented x4. VS at baseline, denies pain, nausea and SOB. Up SBA. Tele- SB 51 as per tele tech.  and 119. Pt had swollen ankle bilateral, and stated that it might be due to sitting for a long time as she had visitors, the BLE where elevated.    Major Shift Events: uneventful     Treatment Plan: Pt is on Abx Rocephin, manage symptom, BG checks, cardiac monitoring.    Bedside Nurse: Mallika Suarez RN

## 2022-10-19 ENCOUNTER — MEDICAL CORRESPONDENCE (OUTPATIENT)
Dept: HEALTH INFORMATION MANAGEMENT | Facility: CLINIC | Age: 87
End: 2022-10-19

## 2023-01-24 RX ORDER — LOSARTAN POTASSIUM 50 MG/1
1 TABLET ORAL DAILY
COMMUNITY
Start: 2021-11-01 | End: 2023-08-14

## 2023-01-24 RX ORDER — HYDROCORTISONE CREAM 1% 10 MG/G
CREAM TOPICAL
COMMUNITY
Start: 2021-11-02

## 2023-01-30 ENCOUNTER — ANESTHESIA (OUTPATIENT)
Dept: SURGERY | Facility: CLINIC | Age: 88
End: 2023-01-30
Payer: MEDICARE

## 2023-01-30 ENCOUNTER — ANESTHESIA EVENT (OUTPATIENT)
Dept: SURGERY | Facility: CLINIC | Age: 88
End: 2023-01-30
Payer: MEDICARE

## 2023-01-30 ENCOUNTER — HOSPITAL ENCOUNTER (OUTPATIENT)
Facility: CLINIC | Age: 88
Discharge: HOME | End: 2023-01-30
Attending: INTERNAL MEDICINE | Admitting: INTERNAL MEDICINE
Payer: MEDICARE

## 2023-01-30 VITALS
RESPIRATION RATE: 16 BRPM | OXYGEN SATURATION: 97 % | WEIGHT: 148.1 LBS | HEIGHT: 61 IN | SYSTOLIC BLOOD PRESSURE: 147 MMHG | HEART RATE: 49 BPM | TEMPERATURE: 97.3 F | BODY MASS INDEX: 27.96 KG/M2 | DIASTOLIC BLOOD PRESSURE: 67 MMHG

## 2023-01-30 DIAGNOSIS — K29.00 ACUTE GASTRITIS WITHOUT HEMORRHAGE, UNSPECIFIED GASTRITIS TYPE: ICD-10-CM

## 2023-01-30 LAB
GLUCOSE BLDC GLUCOMTR-MCNC: 113 MG/DL (ref 70–99)
GLUCOSE BLDC GLUCOMTR-MCNC: 92 MG/DL (ref 70–99)
UPPER GI ENDOSCOPY: NORMAL

## 2023-01-30 PROCEDURE — 710N000012 HC RECOVERY PHASE 2, PER MINUTE: Performed by: INTERNAL MEDICINE

## 2023-01-30 PROCEDURE — 370N000017 HC ANESTHESIA TECHNICAL FEE, PER MIN: Performed by: INTERNAL MEDICINE

## 2023-01-30 PROCEDURE — 360N000075 HC SURGERY LEVEL 2, PER MIN: Performed by: INTERNAL MEDICINE

## 2023-01-30 PROCEDURE — 250N000009 HC RX 250: Performed by: NURSE ANESTHETIST, CERTIFIED REGISTERED

## 2023-01-30 PROCEDURE — 82962 GLUCOSE BLOOD TEST: CPT

## 2023-01-30 PROCEDURE — C1726 CATH, BAL DIL, NON-VASCULAR: HCPCS | Performed by: INTERNAL MEDICINE

## 2023-01-30 PROCEDURE — 250N000011 HC RX IP 250 OP 636: Performed by: NURSE ANESTHETIST, CERTIFIED REGISTERED

## 2023-01-30 PROCEDURE — 999N000141 HC STATISTIC PRE-PROCEDURE NURSING ASSESSMENT: Performed by: INTERNAL MEDICINE

## 2023-01-30 PROCEDURE — 272N000001 HC OR GENERAL SUPPLY STERILE: Performed by: INTERNAL MEDICINE

## 2023-01-30 PROCEDURE — 258N000003 HC RX IP 258 OP 636: Performed by: NURSE ANESTHETIST, CERTIFIED REGISTERED

## 2023-01-30 RX ORDER — PROPOFOL 10 MG/ML
INJECTION, EMULSION INTRAVENOUS PRN
Status: DISCONTINUED | OUTPATIENT
Start: 2023-01-30 | End: 2023-01-30

## 2023-01-30 RX ORDER — PANTOPRAZOLE SODIUM 40 MG/1
40 TABLET, DELAYED RELEASE ORAL DAILY
Qty: 60 TABLET | Refills: 1 | Status: SHIPPED | OUTPATIENT
Start: 2023-01-30

## 2023-01-30 RX ORDER — GLYCOPYRROLATE 0.2 MG/ML
INJECTION, SOLUTION INTRAMUSCULAR; INTRAVENOUS PRN
Status: DISCONTINUED | OUTPATIENT
Start: 2023-01-30 | End: 2023-01-30

## 2023-01-30 RX ORDER — ONDANSETRON 2 MG/ML
INJECTION INTRAMUSCULAR; INTRAVENOUS PRN
Status: DISCONTINUED | OUTPATIENT
Start: 2023-01-30 | End: 2023-01-30

## 2023-01-30 RX ORDER — NALOXONE HYDROCHLORIDE 0.4 MG/ML
0.2 INJECTION, SOLUTION INTRAMUSCULAR; INTRAVENOUS; SUBCUTANEOUS
Status: DISCONTINUED | OUTPATIENT
Start: 2023-01-30 | End: 2023-01-30 | Stop reason: HOSPADM

## 2023-01-30 RX ORDER — PROCHLORPERAZINE MALEATE 5 MG
5 TABLET ORAL EVERY 6 HOURS PRN
Status: DISCONTINUED | OUTPATIENT
Start: 2023-01-30 | End: 2023-01-30 | Stop reason: HOSPADM

## 2023-01-30 RX ORDER — LIDOCAINE HYDROCHLORIDE 10 MG/ML
INJECTION, SOLUTION INFILTRATION; PERINEURAL PRN
Status: DISCONTINUED | OUTPATIENT
Start: 2023-01-30 | End: 2023-01-30

## 2023-01-30 RX ORDER — ACETAMINOPHEN 325 MG/1
975 TABLET ORAL
Status: DISCONTINUED | OUTPATIENT
Start: 2023-01-30 | End: 2023-01-30 | Stop reason: HOSPADM

## 2023-01-30 RX ORDER — FLUMAZENIL 0.1 MG/ML
0.2 INJECTION, SOLUTION INTRAVENOUS
Status: DISCONTINUED | OUTPATIENT
Start: 2023-01-30 | End: 2023-01-30 | Stop reason: HOSPADM

## 2023-01-30 RX ORDER — SODIUM CHLORIDE, SODIUM LACTATE, POTASSIUM CHLORIDE, CALCIUM CHLORIDE 600; 310; 30; 20 MG/100ML; MG/100ML; MG/100ML; MG/100ML
INJECTION, SOLUTION INTRAVENOUS CONTINUOUS PRN
Status: DISCONTINUED | OUTPATIENT
Start: 2023-01-30 | End: 2023-01-30

## 2023-01-30 RX ORDER — NALOXONE HYDROCHLORIDE 0.4 MG/ML
0.4 INJECTION, SOLUTION INTRAMUSCULAR; INTRAVENOUS; SUBCUTANEOUS
Status: DISCONTINUED | OUTPATIENT
Start: 2023-01-30 | End: 2023-01-30 | Stop reason: HOSPADM

## 2023-01-30 RX ORDER — ONDANSETRON 2 MG/ML
4 INJECTION INTRAMUSCULAR; INTRAVENOUS EVERY 6 HOURS PRN
Status: DISCONTINUED | OUTPATIENT
Start: 2023-01-30 | End: 2023-01-30 | Stop reason: HOSPADM

## 2023-01-30 RX ORDER — ONDANSETRON 2 MG/ML
4 INJECTION INTRAMUSCULAR; INTRAVENOUS
Status: DISCONTINUED | OUTPATIENT
Start: 2023-01-30 | End: 2023-01-30 | Stop reason: HOSPADM

## 2023-01-30 RX ORDER — PROCHLORPERAZINE 25 MG
12.5 SUPPOSITORY, RECTAL RECTAL EVERY 12 HOURS PRN
Status: DISCONTINUED | OUTPATIENT
Start: 2023-01-30 | End: 2023-01-30 | Stop reason: HOSPADM

## 2023-01-30 RX ORDER — LIDOCAINE 40 MG/G
CREAM TOPICAL
Status: DISCONTINUED | OUTPATIENT
Start: 2023-01-30 | End: 2023-01-30 | Stop reason: HOSPADM

## 2023-01-30 RX ORDER — SODIUM CHLORIDE, SODIUM LACTATE, POTASSIUM CHLORIDE, CALCIUM CHLORIDE 600; 310; 30; 20 MG/100ML; MG/100ML; MG/100ML; MG/100ML
INJECTION, SOLUTION INTRAVENOUS CONTINUOUS
Status: DISCONTINUED | OUTPATIENT
Start: 2023-01-30 | End: 2023-01-30 | Stop reason: HOSPADM

## 2023-01-30 RX ORDER — ONDANSETRON 4 MG/1
4 TABLET, ORALLY DISINTEGRATING ORAL EVERY 6 HOURS PRN
Status: DISCONTINUED | OUTPATIENT
Start: 2023-01-30 | End: 2023-01-30 | Stop reason: HOSPADM

## 2023-01-30 RX ADMIN — GLYCOPYRROLATE 0.1 MG: 0.2 INJECTION, SOLUTION INTRAMUSCULAR; INTRAVENOUS at 10:17

## 2023-01-30 RX ADMIN — LIDOCAINE HYDROCHLORIDE 50 MG: 10 INJECTION, SOLUTION INFILTRATION; PERINEURAL at 09:50

## 2023-01-30 RX ADMIN — PROPOFOL 30 MG: 10 INJECTION, EMULSION INTRAVENOUS at 10:02

## 2023-01-30 RX ADMIN — PROPOFOL 50 MG: 10 INJECTION, EMULSION INTRAVENOUS at 09:50

## 2023-01-30 RX ADMIN — GLYCOPYRROLATE 0.2 MG: 0.2 INJECTION, SOLUTION INTRAMUSCULAR; INTRAVENOUS at 09:50

## 2023-01-30 RX ADMIN — SODIUM CHLORIDE, POTASSIUM CHLORIDE, SODIUM LACTATE AND CALCIUM CHLORIDE: 600; 310; 30; 20 INJECTION, SOLUTION INTRAVENOUS at 09:50

## 2023-01-30 RX ADMIN — ONDANSETRON 4 MG: 2 INJECTION INTRAMUSCULAR; INTRAVENOUS at 09:50

## 2023-01-30 RX ADMIN — PROPOFOL 50 MG: 10 INJECTION, EMULSION INTRAVENOUS at 09:56

## 2023-01-30 ASSESSMENT — ACTIVITIES OF DAILY LIVING (ADL)
ADLS_ACUITY_SCORE: 35
ADLS_ACUITY_SCORE: 35

## 2023-01-30 NOTE — ANESTHESIA PREPROCEDURE EVALUATION
Anesthesia Pre-Procedure Evaluation    Patient: Edith Thacker   MRN: [id number] : 1935        Procedure : Procedure(s):  ESOPHAGOGASTRODUODENOSCOPY          Past Medical History:   Diagnosis Date     Diabetes (H)      High cholesterol      Hypertension      Restless leg       Past Surgical History:   Procedure Laterality Date     ESOPHAGOSCOPY, GASTROSCOPY, DUODENOSCOPY (EGD), COMBINED N/A 10/10/2022    Procedure: ESOPHAGOGASTRODUODENOSCOPY, WITH BIOPSY;  Surgeon: Abel Lopez MD;  Location:  GI     ORTHOPEDIC SURGERY Left     ORIF elbow      Allergies   Allergen Reactions     Amlodipine Swelling     3+ LE edema on amlodipine 5 mg.      Social History     Tobacco Use     Smoking status: Never     Smokeless tobacco: Not on file   Substance Use Topics     Alcohol use: Yes     Comment: rarely      Wt Readings from Last 1 Encounters:   23 67.2 kg (148 lb 1.6 oz)        Anesthesia Evaluation   Pt has had prior anesthetic. Type: General and MAC.    No history of anesthetic complications       ROS/MED HX  ENT/Pulmonary:  - neg pulmonary ROS  (-) recent URI   Neurologic:  - neg neurologic ROS     Cardiovascular:     (+) Dyslipidemia hypertension-----Irregular Heartbeat/Palpitations ( SVT episodes on recent holter; pt denies issues), Previous cardiac testing   Echo: Date:  Results:     Left ventricular systolic function is normal.  The visual ejection fraction is 60-65%.  No regional wall motion abnormalities noted.  The study was technically adequate. There is no comparison study available.  Stress Test: Date: Results:    ECG Reviewed: Date: Results:    Cath: Date: Results:      METS/Exercise Tolerance:     Hematologic:       Musculoskeletal:       GI/Hepatic: Comment: F/u EGD for gastric ulcer causing outlet obstruction, hospitalized 10/2022. No recent N/V      Renal/Genitourinary:       Endo:     (+) type II DM,     Psychiatric/Substance Use:       Infectious Disease:       Malignancy:       Other:             Physical Exam    Airway        Mallampati: II   TM distance: > 3 FB   Neck ROM: full   Mouth opening: > 3 cm    Respiratory Devices and Support         Dental       (+) Completely normal teeth      Cardiovascular          Rhythm and rate: regular and bradycardia     Pulmonary   pulmonary exam normal                OUTSIDE LABS:  CBC:   Lab Results   Component Value Date    WBC 5.2 10/12/2022    WBC 5.1 10/11/2022    HGB 10.0 (L) 10/12/2022    HGB 10.1 (L) 10/11/2022    HCT 32.2 (L) 10/12/2022    HCT 32.4 (L) 10/11/2022     10/12/2022     (L) 10/11/2022     BMP:   Lab Results   Component Value Date     10/12/2022     10/11/2022    POTASSIUM 3.9 10/12/2022    POTASSIUM 3.8 10/11/2022    CHLORIDE 109 (H) 10/12/2022    CHLORIDE 107 10/11/2022    CO2 25 10/12/2022    CO2 29 10/11/2022    BUN 14.6 10/12/2022    BUN 13.5 10/11/2022    CR 1.37 (H) 10/12/2022    CR 1.43 (H) 10/11/2022     (H) 01/30/2023     (H) 10/13/2022     COAGS:   Lab Results   Component Value Date    PTT 25 04/20/2015    INR 0.90 04/20/2015     POC: No results found for: BGM, HCG, HCGS  HEPATIC:   Lab Results   Component Value Date    ALBUMIN 3.8 10/09/2022    PROTTOTAL 7.1 10/09/2022    ALT 13 10/09/2022    AST 21 10/09/2022    ALKPHOS 52 10/09/2022    BILITOTAL 0.4 10/09/2022     OTHER:   Lab Results   Component Value Date    A1C 6.4 (H) 10/09/2022    LOUISE 8.0 (L) 10/12/2022    MAG 1.9 10/12/2022    LIPASE 33 10/09/2022    TSH 3.12 10/09/2022       Anesthesia Plan    ASA Status:  2   NPO Status:  NPO Appropriate    Anesthesia Type: MAC.     - Reason for MAC: straight local not clinically adequate              Consents    Anesthesia Plan(s) and associated risks, benefits, and realistic alternatives discussed. Questions answered and patient/representative(s) expressed understanding.    - Discussed:     - Discussed with:  Patient      - Extended Intubation/Ventilatory Support Discussed: No.      - Patient  is DNR/DNI Status: No    Use of blood products discussed: No .     Postoperative Care    Pain management: IV analgesics, Oral pain medications, Multi-modal analgesia.   PONV prophylaxis: Ondansetron (or other 5HT-3)     Comments:                Yelena Mi MD

## 2023-01-30 NOTE — ANESTHESIA CARE TRANSFER NOTE
Patient: Edith Thacker    Procedure: Procedure(s):  ESOPHAGOGASTRODUODENOSCOPY with pyloric balloon dilation       Diagnosis: Peptic ulcer [K27.9]  Diagnosis Additional Information: No value filed.    Anesthesia Type:   MAC     Note:    Oropharynx: oropharynx clear of all foreign objects and spontaneously breathing  Level of Consciousness: drowsy  Oxygen Supplementation: room air    Independent Airway: airway patency satisfactory and stable  Dentition: dentition unchanged  Vital Signs Stable: post-procedure vital signs reviewed and stable  Report to RN Given: handoff report given  Patient transferred to: Phase II  Comments: No issues   Handoff Report: Identifed the Patient, Identified the Reponsible Provider, Reviewed the pertinent medical history, Discussed the surgical course, Reviewed Intra-OP anesthesia mangement and issues during anesthesia and Allowed opportunity for questions and acknowledgement of understanding      Vitals:  Vitals Value Taken Time   BP     Temp     Pulse     Resp     SpO2         Electronically Signed By: ROSY Blum CRNA  January 30, 2023  10:26 AM

## 2023-01-30 NOTE — DISCHARGE INSTRUCTIONS
SEDATION ADULT DISCHARGE INSTRUCTIONS   SPECIAL PRECAUTIONS FOR 24 HOURS AFTER SURGERY    IT IS NOT UNUSUAL TO FEEL LIGHT-HEADED OR FAINT, UP TO 24 HOURS AFTER SURGERY OR WHILE TAKING PAIN MEDICATION.  IF YOU HAVE THESE SYMPTOMS; SIT FOR A FEW MINUTES BEFORE STANDING AND HAVE SOMEONE ASSIST YOU WHEN YOU GET UP TO WALK OR USE THE BATHROOM.    YOU SHOULD REST AND RELAX FOR THE NEXT 24 HOURS AND YOU MUST MAKE ARRANGEMENTS TO HAVE SOMEONE STAY WITH YOU FOR AT LEAST 24 HOURS AFTER YOUR DISCHARGE.  AVOID HAZARDOUS AND STRENUOUS ACTIVITIES.  DO NOT MAKE IMPORTANT DECISIONS FOR 24 HOURS.    DO NOT DRIVE ANY VEHICLE OR OPERATE MECHANICAL EQUIPMENT FOR 24 HOURS FOLLOWING THE END OF YOUR SURGERY.  EVEN THOUGH YOU MAY FEEL NORMAL, YOUR REACTIONS MAY BE AFFECTED BY THE MEDICATION YOU HAVE RECEIVED.    DO NOT DRINK ALCOHOLIC BEVERAGES FOR 24 HOURS FOLLOWING YOUR SURGERY.    DRINK CLEAR LIQUIDS (APPLE JUICE, GINGER ALE, 7-UP, BROTH, ETC.).  PROGRESS TO YOUR REGULAR DIET AS YOU FEEL ABLE.    YOU MAY HAVE A DRY MOUTH, A SORE THROAT, MUSCLES ACHES OR TROUBLE SLEEPING.  THESE SHOULD GO AWAY AFTER 24 HOURS.    CALL YOUR DOCTOR FOR ANY OF THE FOLLOWING:  SIGNS OF INFECTION (FEVER, GROWING TENDERNESS AT THE SURGERY SITE, A LARGE AMOUNT OF DRAINAGE OR BLEEDING, SEVERE PAIN, FOUL-SMELLING DRAINAGE, REDNESS OR SWELLING.    IT HAS BEEN OVER 8 TO 10 HOURS SINCE SURGERY AND YOU ARE STILL NOT ABLE TO URINATE (PASS WATER).     Maximum acetaminophen (Tylenol) dose from all sources should not exceed 4 grams (4000 mg) per day.    DR. SOPHIA MCFADDEN M.D.   CLINIC PHONE NUMBER:  [phone]

## 2023-01-30 NOTE — ANESTHESIA POSTPROCEDURE EVALUATION
Patient: Edith Thacker    Procedure: Procedure(s):  ESOPHAGOGASTRODUODENOSCOPY with pyloric balloon dilation       Anesthesia Type:  MAC    Note:  Disposition: Outpatient   Postop Pain Control: Uneventful            Sign Out: Well controlled pain   PONV: No   Neuro/Psych: Uneventful            Sign Out: Acceptable/Baseline neuro status   Airway/Respiratory: Uneventful            Sign Out: Acceptable/Baseline resp. status   CV/Hemodynamics: Uneventful            Sign Out: Acceptable CV status; No obvious hypovolemia; No obvious fluid overload   Other NRE: NONE   DID A NON-ROUTINE EVENT OCCUR? No           Last vitals:  Vitals Value Taken Time   BP 93/34 01/30/23 1027   Temp 96.7  F (35.9  C) 01/30/23 1027   Pulse 65 01/30/23 1027   Resp 15 01/30/23 1027   SpO2 97 % 01/30/23 1027       Electronically Signed By: Yelena Mi MD  January 30, 2023  10:31 AM

## 2023-01-30 NOTE — OR NURSING
Patient presented with Hrs in low 40s. Last took Metoprolol XL last evening. MDA aware and ok to proceed.

## 2023-02-07 ENCOUNTER — DOCUMENTATION ONLY (OUTPATIENT)
Dept: OTHER | Facility: CLINIC | Age: 88
End: 2023-02-07
Payer: MEDICARE

## 2023-08-14 ENCOUNTER — HOSPITAL ENCOUNTER (EMERGENCY)
Facility: CLINIC | Age: 88
Discharge: HOME | End: 2023-08-14
Attending: EMERGENCY MEDICINE | Admitting: EMERGENCY MEDICINE
Payer: MEDICARE

## 2023-08-14 ENCOUNTER — APPOINTMENT (OUTPATIENT)
Dept: CT IMAGING | Facility: CLINIC | Age: 88
End: 2023-08-14
Attending: EMERGENCY MEDICINE
Payer: MEDICARE

## 2023-08-14 VITALS
SYSTOLIC BLOOD PRESSURE: 194 MMHG | HEART RATE: 41 BPM | TEMPERATURE: 96.9 F | OXYGEN SATURATION: 98 % | RESPIRATION RATE: 18 BRPM | DIASTOLIC BLOOD PRESSURE: 80 MMHG

## 2023-08-14 DIAGNOSIS — R00.1 SINUS BRADYCARDIA: ICD-10-CM

## 2023-08-14 DIAGNOSIS — R93.5 ABNORMAL ENDOMETRIAL ULTRASOUND: ICD-10-CM

## 2023-08-14 DIAGNOSIS — R13.19 ESOPHAGEAL DYSPHAGIA: ICD-10-CM

## 2023-08-14 DIAGNOSIS — I10 HYPERTENSION, UNSPECIFIED TYPE: ICD-10-CM

## 2023-08-14 DIAGNOSIS — R10.10 UPPER ABDOMINAL PAIN: ICD-10-CM

## 2023-08-14 DIAGNOSIS — K44.9 HIATAL HERNIA: ICD-10-CM

## 2023-08-14 LAB
ALBUMIN SERPL BCG-MCNC: 4.1 G/DL (ref 3.5–5.2)
ALP SERPL-CCNC: 44 U/L (ref 35–104)
ALT SERPL W P-5'-P-CCNC: 12 U/L (ref 0–50)
ANION GAP SERPL CALCULATED.3IONS-SCNC: 11 MMOL/L (ref 7–15)
AST SERPL W P-5'-P-CCNC: 20 U/L (ref 0–45)
BASOPHILS # BLD AUTO: 0 10E3/UL (ref 0–0.2)
BASOPHILS NFR BLD AUTO: 1 %
BILIRUB DIRECT SERPL-MCNC: <0.2 MG/DL (ref 0–0.3)
BILIRUB SERPL-MCNC: 0.4 MG/DL
BUN SERPL-MCNC: 34 MG/DL (ref 8–23)
CALCIUM SERPL-MCNC: 9.7 MG/DL (ref 8.8–10.2)
CHLORIDE SERPL-SCNC: 100 MMOL/L (ref 98–107)
CREAT SERPL-MCNC: 1.77 MG/DL (ref 0.51–0.95)
DEPRECATED HCO3 PLAS-SCNC: 29 MMOL/L (ref 22–29)
EOSINOPHIL # BLD AUTO: 0.3 10E3/UL (ref 0–0.7)
EOSINOPHIL NFR BLD AUTO: 5 %
ERYTHROCYTE [DISTWIDTH] IN BLOOD BY AUTOMATED COUNT: 12.4 % (ref 10–15)
GFR SERPL CREATININE-BSD FRML MDRD: 28 ML/MIN/1.73M2
GLUCOSE SERPL-MCNC: 109 MG/DL (ref 70–99)
HCT VFR BLD AUTO: 37.7 % (ref 35–47)
HGB BLD-MCNC: 12.4 G/DL (ref 11.7–15.7)
HOLD SPECIMEN: NORMAL
HOLD SPECIMEN: NORMAL
IMM GRANULOCYTES # BLD: 0 10E3/UL
IMM GRANULOCYTES NFR BLD: 0 %
LIPASE SERPL-CCNC: 24 U/L (ref 13–60)
LYMPHOCYTES # BLD AUTO: 1.7 10E3/UL (ref 0.8–5.3)
LYMPHOCYTES NFR BLD AUTO: 28 %
MCH RBC QN AUTO: 30.1 PG (ref 26.5–33)
MCHC RBC AUTO-ENTMCNC: 32.9 G/DL (ref 31.5–36.5)
MCV RBC AUTO: 92 FL (ref 78–100)
MONOCYTES # BLD AUTO: 0.6 10E3/UL (ref 0–1.3)
MONOCYTES NFR BLD AUTO: 9 %
NEUTROPHILS # BLD AUTO: 3.5 10E3/UL (ref 1.6–8.3)
NEUTROPHILS NFR BLD AUTO: 57 %
NRBC # BLD AUTO: 0 10E3/UL
NRBC BLD AUTO-RTO: 0 /100
PLATELET # BLD AUTO: 186 10E3/UL (ref 150–450)
POTASSIUM SERPL-SCNC: 4 MMOL/L (ref 3.4–5.3)
PROT SERPL-MCNC: 6.9 G/DL (ref 6.4–8.3)
RBC # BLD AUTO: 4.12 10E6/UL (ref 3.8–5.2)
SODIUM SERPL-SCNC: 140 MMOL/L (ref 136–145)
WBC # BLD AUTO: 6.1 10E3/UL (ref 4–11)

## 2023-08-14 PROCEDURE — 80053 COMPREHEN METABOLIC PANEL: CPT | Performed by: EMERGENCY MEDICINE

## 2023-08-14 PROCEDURE — 85025 COMPLETE CBC W/AUTO DIFF WBC: CPT | Performed by: EMERGENCY MEDICINE

## 2023-08-14 PROCEDURE — 96361 HYDRATE IV INFUSION ADD-ON: CPT

## 2023-08-14 PROCEDURE — 99284 EMERGENCY DEPT VISIT MOD MDM: CPT | Mod: 25

## 2023-08-14 PROCEDURE — 258N000003 HC RX IP 258 OP 636: Performed by: EMERGENCY MEDICINE

## 2023-08-14 PROCEDURE — 82248 BILIRUBIN DIRECT: CPT | Performed by: EMERGENCY MEDICINE

## 2023-08-14 PROCEDURE — 96374 THER/PROPH/DIAG INJ IV PUSH: CPT

## 2023-08-14 PROCEDURE — G1010 CDSM STANSON: HCPCS

## 2023-08-14 PROCEDURE — 250N000011 HC RX IP 250 OP 636: Performed by: EMERGENCY MEDICINE

## 2023-08-14 PROCEDURE — 250N000013 HC RX MED GY IP 250 OP 250 PS 637: Performed by: EMERGENCY MEDICINE

## 2023-08-14 PROCEDURE — 83690 ASSAY OF LIPASE: CPT | Performed by: EMERGENCY MEDICINE

## 2023-08-14 PROCEDURE — 36415 COLL VENOUS BLD VENIPUNCTURE: CPT | Performed by: EMERGENCY MEDICINE

## 2023-08-14 RX ORDER — HYDRALAZINE HYDROCHLORIDE 20 MG/ML
5 INJECTION INTRAMUSCULAR; INTRAVENOUS ONCE
Status: DISCONTINUED | OUTPATIENT
Start: 2023-08-14 | End: 2023-08-14

## 2023-08-14 RX ORDER — MAGNESIUM HYDROXIDE/ALUMINUM HYDROXICE/SIMETHICONE 120; 1200; 1200 MG/30ML; MG/30ML; MG/30ML
30 SUSPENSION ORAL ONCE
Status: COMPLETED | OUTPATIENT
Start: 2023-08-14 | End: 2023-08-14

## 2023-08-14 RX ORDER — ONDANSETRON 4 MG/1
4 TABLET, ORALLY DISINTEGRATING ORAL ONCE
Status: COMPLETED | OUTPATIENT
Start: 2023-08-14 | End: 2023-08-14

## 2023-08-14 RX ORDER — LOSARTAN POTASSIUM 50 MG/1
50 TABLET ORAL 2 TIMES DAILY
Qty: 30 TABLET | Refills: 0 | Status: SHIPPED | OUTPATIENT
Start: 2023-08-14

## 2023-08-14 RX ADMIN — ONDANSETRON 4 MG: 4 TABLET, ORALLY DISINTEGRATING ORAL at 14:20

## 2023-08-14 RX ADMIN — SODIUM CHLORIDE 1000 ML: 9 INJECTION, SOLUTION INTRAVENOUS at 17:17

## 2023-08-14 RX ADMIN — ALUMINUM HYDROXIDE, MAGNESIUM HYDROXIDE, AND DIMETHICONE 30 ML: 200; 20; 200 SUSPENSION ORAL at 17:34

## 2023-08-14 ASSESSMENT — ACTIVITIES OF DAILY LIVING (ADL)
ADLS_ACUITY_SCORE: 35
ADLS_ACUITY_SCORE: 35

## 2023-08-14 NOTE — ED TRIAGE NOTES
Patient presents to the ED reporting worsening upper abdominal pain.  has a history of an ulcer and this feels like similar pain. States pain is worsened after eating and reports some nausea.

## 2023-08-14 NOTE — ED NOTES
PIT/Triage Evaluation    Patient presented with abdominal pain. The patient states that she is having upper abdominal pain that feels like when she had an ulcer about a year ago which was obstructing her intestines. She started to feel her current symptoms about 4 days ago. She does have some nausea and a headache. She is not having problems urinating or having bowel movements. She is still on ulcer medication. She denies any past abdominal surgeries.    Exam is notable for:    Eyes:    Conjunctiva normal  Neck:    Supple, no meningismus.     PULM:    No respiratory distress.      No stridor.  ABD:    Soft, non-distended.       Mild diffuse upper abdominal tenderness     No pulsatile masses.       No rebound, guarding or rigidity.     No CVA tenderness.   MSK:     No gross deformity to all four extremities.   LYMPH:   No cervical lymphadenopathy.  NEURO:   Alert.  Good muscular tone, no atrophy.   Skin:    Warm, dry and intact.    Psych:    Mood is good and affect is appropriate.      Appropriate interventions for symptom management were initiated if applicable.  Appropriate diagnostic tests were initiated if indicated.    Important information for subsequent clinician:  Labs and CT ordered    I briefly evaluated the patient and developed an initial plan of care. I discussed this plan and explained that this brief interaction does not constitute a full evaluation. Patient/family understands that they should wait to be fully evaluated and discuss any test results with another clinician prior to leaving the hospital.       Twan Zafar MD  08/14/23 1951

## 2023-08-14 NOTE — DISCHARGE INSTRUCTIONS
Your heart rate is slow (bradycardic) so you should discontinue the metoprolol you are on for high blood pressure as this also causes low heart rate. Increase your dose of losartan short term until reassessment with your primary care provider.     Discharge Instructions  Abdominal Pain    Abdominal pain (belly pain) can be caused by many things. Your evaluation today does not show the exact cause for your pain. Your provider today has decided that it is unlikely your pain is due to a life threatening problem, or a problem requiring surgery or hospital admission. Sometimes those problems cannot be found right away, so it is very important that you follow up as directed.  Sometimes only the changes which occur over time allow the cause of your pain to be found.    Generally, every Emergency Department visit should have a follow-up clinic visit with either a primary or a specialty clinic/provider. Please follow-up as instructed by your emergency provider today. With abdominal pain, we often recommend very close follow-up, such as the following day.    ADULTS:  Return to the Emergency Department right away if:    You get an oral temperature above 102oF or as directed by your provider.  You have blood in your stools. This may be bright red or appear as black, tarry stools.    You keep vomiting (throwing up) or cannot drink liquids.  You see blood when you vomit.   You cannot have a bowel movement or you cannot pass gas.  Your stomach gets bloated or bigger.  Your skin or the whites of your eyes look yellow.  You faint.  You have bloody, frequent or painful urination (peeing).  You have new symptoms or anything that worries you.    CHILDREN:  Return to the Emergency Department right away if your child has any of the above-listed symptoms or the following:    Pushes your hand away or screams/cries when his/her belly is touched.  You notice your child is very fussy or weak.  Your child is very tired and is too tired to eat  or drink.  Your child is dehydrated.  Signs of dehydration can be:  Significant change in the amount of wet diapers/urine.  Your infant or child starts to have dry mouth and lips, or no saliva (spit) or tears.    PREGNANT WOMEN:  Return to the Emergency Department right away if you have any of the above-listed symptoms or the following:    You have bleeding, leaking fluid or passing tissue from the vagina.  You have worse pain or cramping, or pain in your shoulder or back.  You have vomiting that will not stop.  You have a temperature of 100oF or more.  Your baby is not moving as much as usual.  You faint.  You get a bad headache with or without eye problems and abdominal pain.  You have a seizure.  You have unusual discharge from your vagina and abdominal pain.    Abdominal pain is pretty common during pregnancy.  Your pain may or may not be related to your pregnancy. You should follow-up closely with your OB provider so they can evaluate you and your baby.  Until you follow-up with your regular provider, do the following:     Avoid sex and do not put anything in your vagina.  Drink clear fluids.  Only take medications approved by your provider.    MORE INFORMATION:    Appendicitis:  A possible cause of abdominal pain in any person who still has their appendix is acute appendicitis. Appendicitis is often hard to diagnose.  Testing does not always rule out early appendicitis or other causes of abdominal pain. Close follow-up with your provider and re-evaluations may be needed to figure out the reason for your abdominal pain.    Follow-up:  It is very important that you make an appointment with your clinic and go to the appointment.  If you do not follow-up with your primary provider, it may result in missing an important development which could result in permanent injury or disability and/or lasting pain.  If there is any problem keeping your appointment, call your provider or return to the Emergency  "Department.    Medications:  Take your medications as directed by your provider today.  Before using over-the-counter medications, ask your provider and make sure to take the medications as directed.  If you have any questions about medications, ask your provider.    Diet:  Resume your normal diet as much as possible, but do not eat fried, fatty or spicy foods while you have pain.  Do not drink alcohol or have caffeine.  Do not smoke tobacco.    Probiotics: If you have been given an antibiotic, you may want to also take a probiotic pill or eat yogurt with live cultures. Probiotics have \"good bacteria\" to help your intestines stay healthy. Studies have shown that probiotics help prevent diarrhea (loose stools) and other intestine problems (including C. diff infection) when you take antibiotics. You can buy these without a prescription in the pharmacy section of the store.     If you were given a prescription for medicine here today, be sure to read all of the information (including the package insert) that comes with your prescription.  This will include important information about the medicine, its side effects, and any warnings that you need to know about.  The pharmacist who fills the prescription can provide more information and answer questions you may have about the medicine.  If you have questions or concerns that the pharmacist cannot address, please call or return to the Emergency Department.       Remember that you can always come back to the Emergency Department if you are not able to see your regular provider in the amount of time listed above, if you get any new symptoms, or if there is anything that worries you.   "

## 2023-08-14 NOTE — ED PROVIDER NOTES
History     Chief Complaint:  Abdominal Pain       The history is provided by the patient.      Edith Thacker is a 88 year old female who presents with abdominal pain. She explains that last October she had an ulcer and had I dilation done. She states that the pain is similar to those symptoms. She feels pain when she is eating but food does pass as well as secretions. Has had normal bowl and urination movements. She does note that she is able to swallow her pills. She does have mild nausea but no vomiting. Denies any chest pain or shortness of breath. Daughter states that Charlotte did her last dilation.       Independent Historian:   Daughter - They report per HPI    Review of External Notes:   Outpatient clinic notes reviewed.       Medications:    Gabapentin   Hydrochlorothiazide   Hydrocortisone acetate   Losartan   Metformin   Metoprolol succinate   Pantoprazole   Simvastatin   Triamcinolone   Valsartan    Past Medical History:    Diabetes   High cholesterol   Hypertension   Restless leg   Silvia     Past Surgical History:    EGD x2  Orif left olecranon   Dilation and curettage        Physical Exam   Patient Vitals for the past 24 hrs:   BP Temp Temp src Pulse Resp SpO2   08/14/23 1844 (!) 194/80 -- -- (!) 41 -- 98 %   08/14/23 1729 (!) 185/69 -- -- -- -- --   08/14/23 1655 (!) 197/67 -- -- (!) 42 -- 98 %   08/14/23 1534 (!) 213/77 -- -- (!) 46 18 99 %   08/14/23 1054 (!) 176/60 96.9  F (36.1  C) Temporal (!) 49 18 99 %        Physical Exam  General: Elderly female sitting upright  Eyes: PERRL, Conjunctive within normal limits.  No scleral icterus.  ENT: Moist mucous membranes, oropharynx clear.   CV: Normal S1S2, no murmur, rub or gallop. Regular rate and rhythm  Resp: Clear to auscultation bilaterally, no wheezes, rales or rhonchi. Normal respiratory effort.  GI: Abdomen is soft and nondistended. Mild tenderness to deep palpation. No palpable masses. No rebound or guarding.  MSK: No pitting edema. Nontender.  Normal active range of motion.  Skin: Warm and dry. No rashes or lesions or ecchymoses on visible skin.  Neuro: Alert and oriented. Responds appropriately to all questions and commands. No focal findings appreciated. Normal muscle tone.  Psych: Normal mood and affect. Pleasant.     Emergency Department Course      Imaging:  CT Abdomen Pelvis w/o Contrast   Final Result   IMPRESSION:    1.  No acute abnormality identified.   2.  Stable small hiatal hernia.   3.  Nonspecific mild prominence of the endometrium. Underlying   endometrial lesion is not excluded. This can be further evaluated with   nonurgent ultrasound or MRI.      AMANDA ANDERSON MD            SYSTEM ID:  [id number]         Report per radiology    Laboratory:  Labs Ordered and Resulted from Time of ED Arrival to Time of ED Departure   BASIC METABOLIC PANEL - Abnormal       Result Value    Sodium 140      Potassium 4.0      Chloride 100      Carbon Dioxide (CO2) 29      Anion Gap 11      Urea Nitrogen 34.0 (*)     Creatinine 1.77 (*)     Calcium 9.7      Glucose 109 (*)     GFR Estimate 28 (*)    HEPATIC FUNCTION PANEL - Normal    Protein Total 6.9      Albumin 4.1      Bilirubin Total 0.4      Alkaline Phosphatase 44      AST 20      ALT 12      Bilirubin Direct <0.20     LIPASE - Normal    Lipase 24     CBC WITH PLATELETS AND DIFFERENTIAL    WBC Count 6.1      RBC Count 4.12      Hemoglobin 12.4      Hematocrit 37.7      MCV 92      MCH 30.1      MCHC 32.9      RDW 12.4      Platelet Count 186      % Neutrophils 57      % Lymphocytes 28      % Monocytes 9      % Eosinophils 5      % Basophils 1      % Immature Granulocytes 0      NRBCs per 100 WBC 0      Absolute Neutrophils 3.5      Absolute Lymphocytes 1.7      Absolute Monocytes 0.6      Absolute Eosinophils 0.3      Absolute Basophils 0.0      Absolute Immature Granulocytes 0.0      Absolute NRBCs 0.0        Emergency Department Course & Assessments:       Interventions:  Medications   ondansetron (ZOFRAN  ODT) ODT tab 4 mg (4 mg Oral $Given 8/14/23 1420)   0.9% sodium chloride BOLUS (0 mLs Intravenous Stopped 8/14/23 1836)   alum & mag hydroxide-simethicone (MAALOX) suspension 30 mL (30 mLs Oral $Given 8/14/23 1734)        Assessments:  1607 I obtained history and examined the patient as noted above.  1800 I reassessed the patient. She denies any new concerns. All questions answered.   I discussed today's findings.  I discussed bradycardia and the use of metoprolol.  I discussed cessation of metoprolol use and increase of the losartan.  Discussed plan for follow-up with primary care provider.  She verbalized understanding agree with the plan.    Independent Interpretation (X-rays, CTs, rhythm strip):  Reviewed the patient's CT scan abdomen pelvis.  No evidence of bowel obstruction or perforation.    Consultations/Discussion of Management or Tests:  1620 I spoke with ANDRE Ocampo, regarding the patient's history and presentation in the emergency department today.  He evaluated the patient while in the ED.       Social Determinants of Health affecting care:   None    Disposition:  The patient was discharged to home.     Impression & Plan    CMS Diagnoses: None    Medical Decision Making:  Edith Thacker is an 88-year-old female presents with symptoms of epigastric discomfort after eating, with a sensation that her food is not passing.  She has had past esophageal dilatation for more severe symptoms.  I suspect she likely needs a similar procedure.  She had a overall reassuring evaluation here with regards to her presenting symptoms.  She was incidentally noted to be hypertensive and bradycardic.  This is in the setting of metoprolol use.  She is on multiple antihypertensives, however, is thought in the patient's best interest to discontinue the use of metoprolol.  Losartan was increased.  She should follow-up in expedited fashion with her primary care provider.  With regards to her esophageal issues, GI was consulted while  in the ED was able to see the patient in person.  Dr. Negrete will schedule follow-up procedure, hoping for this week.  The patient at this time feels comfortable denying any current concerns and would like to go home.  This seems reasonable.  She understands the importance of the soft food/liquid diet avoiding specifically meats and hard boluses of food.  Precautions discussed.  All questions answered prior to discharge.    Diagnosis:    ICD-10-CM    1. Upper abdominal pain  R10.10       2. Esophageal dysphagia  R13.19       3. Hypertension, unspecified type  I10       4. Sinus bradycardia  R00.1       5. Hiatal hernia  K44.9       6. Abnormal endometrial ultrasound  R93.5            Discharge Medications:  Discharge Medication List as of 8/14/2023  6:36 PM      Increase losartan to 50 mg twice daily  Discontinue metoprolol use.      Scribe Disclosure:  I, Georgi Miller, am serving as a scribe at 3:56 PM on 8/14/2023 to document services personally performed by Meghan Rodriguez MD based on my observations and the provider's statements to me.   8/14/2023   Meghan Rodriguez MD Jonkman, Tracy Dianne, MD  08/14/23 3836

## 2023-08-15 ENCOUNTER — TRANSCRIBE ORDERS (OUTPATIENT)
Dept: GASTROENTEROLOGY | Facility: CLINIC | Age: 88
End: 2023-08-15
Payer: MEDICARE

## 2023-08-15 ENCOUNTER — TELEPHONE (OUTPATIENT)
Dept: GASTROENTEROLOGY | Facility: CLINIC | Age: 88
End: 2023-08-15
Payer: MEDICARE

## 2023-08-15 DIAGNOSIS — R13.10 DYSPHAGIA: ICD-10-CM

## 2023-08-15 DIAGNOSIS — R13.19 ESOPHAGEAL DYSPHAGIA: Primary | ICD-10-CM

## 2023-08-15 NOTE — TELEPHONE ENCOUNTER
Procedure Scheduled: Upper Endoscopy [EGD]  Procedure Date: 08/18/2023  Site:[facility]; [facility] [address] [place] [address]  Endoscopist: LINK  Ordering Provider: LINK  Scheduled by (per the direction of): LINK          REMINDER: We do not accept [employer] insurance.

## 2023-08-18 ENCOUNTER — HOSPITAL ENCOUNTER (OUTPATIENT)
Facility: CLINIC | Age: 88
Discharge: HOME | End: 2023-08-18
Attending: INTERNAL MEDICINE | Admitting: INTERNAL MEDICINE
Payer: MEDICARE

## 2023-08-18 VITALS
HEART RATE: 95 BPM | DIASTOLIC BLOOD PRESSURE: 76 MMHG | SYSTOLIC BLOOD PRESSURE: 98 MMHG | RESPIRATION RATE: 16 BRPM | OXYGEN SATURATION: 96 %

## 2023-08-18 LAB — UPPER GI ENDOSCOPY: NORMAL

## 2023-08-18 PROCEDURE — 250N000009 HC RX 250: Performed by: INTERNAL MEDICINE

## 2023-08-18 PROCEDURE — G0500 MOD SEDAT ENDO SERVICE >5YRS: HCPCS | Performed by: INTERNAL MEDICINE

## 2023-08-18 PROCEDURE — 43249 ESOPH EGD DILATION <30 MM: CPT | Performed by: INTERNAL MEDICINE

## 2023-08-18 PROCEDURE — 250N000011 HC RX IP 250 OP 636: Performed by: INTERNAL MEDICINE

## 2023-08-18 RX ORDER — EPINEPHRINE 1 MG/ML
0.1 INJECTION, SOLUTION INTRAMUSCULAR; SUBCUTANEOUS
Status: DISCONTINUED | OUTPATIENT
Start: 2023-08-18 | End: 2023-08-18 | Stop reason: HOSPADM

## 2023-08-18 RX ORDER — FLUMAZENIL 0.1 MG/ML
0.2 INJECTION, SOLUTION INTRAVENOUS
Status: DISCONTINUED | OUTPATIENT
Start: 2023-08-18 | End: 2023-08-18 | Stop reason: HOSPADM

## 2023-08-18 RX ORDER — ONDANSETRON 2 MG/ML
4 INJECTION INTRAMUSCULAR; INTRAVENOUS EVERY 6 HOURS PRN
Status: DISCONTINUED | OUTPATIENT
Start: 2023-08-18 | End: 2023-08-18 | Stop reason: HOSPADM

## 2023-08-18 RX ORDER — ONDANSETRON 4 MG/1
4 TABLET, ORALLY DISINTEGRATING ORAL EVERY 6 HOURS PRN
Status: DISCONTINUED | OUTPATIENT
Start: 2023-08-18 | End: 2023-08-18 | Stop reason: HOSPADM

## 2023-08-18 RX ORDER — ATROPINE SULFATE 0.1 MG/ML
1 INJECTION INTRAVENOUS
Status: DISCONTINUED | OUTPATIENT
Start: 2023-08-18 | End: 2023-08-18 | Stop reason: HOSPADM

## 2023-08-18 RX ORDER — NALOXONE HYDROCHLORIDE 0.4 MG/ML
0.2 INJECTION, SOLUTION INTRAMUSCULAR; INTRAVENOUS; SUBCUTANEOUS
Status: DISCONTINUED | OUTPATIENT
Start: 2023-08-18 | End: 2023-08-18 | Stop reason: HOSPADM

## 2023-08-18 RX ORDER — NALOXONE HYDROCHLORIDE 0.4 MG/ML
0.4 INJECTION, SOLUTION INTRAMUSCULAR; INTRAVENOUS; SUBCUTANEOUS
Status: DISCONTINUED | OUTPATIENT
Start: 2023-08-18 | End: 2023-08-18 | Stop reason: HOSPADM

## 2023-08-18 RX ORDER — SIMETHICONE 40MG/0.6ML
133 SUSPENSION, DROPS(FINAL DOSAGE FORM)(ML) ORAL
Status: DISCONTINUED | OUTPATIENT
Start: 2023-08-18 | End: 2023-08-18 | Stop reason: HOSPADM

## 2023-08-18 RX ORDER — PROCHLORPERAZINE MALEATE 5 MG
5 TABLET ORAL EVERY 6 HOURS PRN
Status: DISCONTINUED | OUTPATIENT
Start: 2023-08-18 | End: 2023-08-18 | Stop reason: HOSPADM

## 2023-08-18 RX ORDER — LIDOCAINE 40 MG/G
CREAM TOPICAL
Status: DISCONTINUED | OUTPATIENT
Start: 2023-08-18 | End: 2023-08-18 | Stop reason: HOSPADM

## 2023-08-18 RX ORDER — ONDANSETRON 2 MG/ML
4 INJECTION INTRAMUSCULAR; INTRAVENOUS
Status: DISCONTINUED | OUTPATIENT
Start: 2023-08-18 | End: 2023-08-18 | Stop reason: HOSPADM

## 2023-08-18 RX ORDER — FENTANYL CITRATE 50 UG/ML
50-100 INJECTION, SOLUTION INTRAMUSCULAR; INTRAVENOUS EVERY 5 MIN PRN
Status: DISCONTINUED | OUTPATIENT
Start: 2023-08-18 | End: 2023-08-18 | Stop reason: HOSPADM

## 2023-08-18 RX ORDER — DIPHENHYDRAMINE HYDROCHLORIDE 50 MG/ML
25-50 INJECTION INTRAMUSCULAR; INTRAVENOUS
Status: DISCONTINUED | OUTPATIENT
Start: 2023-08-18 | End: 2023-08-18 | Stop reason: HOSPADM

## 2023-08-18 RX ADMIN — TOPICAL ANESTHETIC 0.5 ML: 200 SPRAY DENTAL; PERIODONTAL at 09:59

## 2023-08-18 RX ADMIN — MIDAZOLAM 1 MG: 1 INJECTION INTRAMUSCULAR; INTRAVENOUS at 09:58

## 2023-08-18 RX ADMIN — FENTANYL CITRATE 50 MCG: 50 INJECTION, SOLUTION INTRAMUSCULAR; INTRAVENOUS at 09:58

## 2023-08-18 ASSESSMENT — ACTIVITIES OF DAILY LIVING (ADL)
ADLS_ACUITY_SCORE: 35
ADLS_ACUITY_SCORE: 35

## 2023-08-18 NOTE — H&P
Pre-Endoscopy History and Physical     Edith Thacker MRN# [id number]   YOB: 1935 Age: 88 year old     Date of Procedure: 8/18/2023  Primary care provider: Tori Dawkins  Type of Endoscopy: esophagogastroduodenoscopy (upper GI endoscopy)  Reason for Procedure: abdominal pain  Type of Anesthesia Anticipated: Conscious Sedation    HPI:    Edith is a 88 year old female who will be undergoing the above procedure.      A history and physical has been performed. The patient's medications and allergies have been reviewed. The risks and benefits of the procedure and the sedation options and risks were discussed with the patient.  All questions were answered and informed consent was obtained.      Patient Active Problem List   Diagnosis    Myocardial injury    Thickened endometrium    Atrial fibrillation with rapid ventricular response (H)    Acute gastritis without hemorrhage, unspecified gastritis type    Nausea and vomiting, unspecified vomiting type        Past Medical History:   Diagnosis Date    Diabetes (H)     High cholesterol     Hypertension     Restless leg         Past Surgical History:   Procedure Laterality Date    ESOPHAGOSCOPY, GASTROSCOPY, DUODENOSCOPY (EGD), COMBINED N/A 10/10/2022    Procedure: ESOPHAGOGASTRODUODENOSCOPY, WITH BIOPSY;  Surgeon: Abel Lopez MD;  Location:  GI    ESOPHAGOSCOPY, GASTROSCOPY, DUODENOSCOPY (EGD), COMBINED N/A 1/30/2023    Procedure: Esophagogastroduodenoscopy with pyloric balloon dilation;  Surgeon: Ezequiel Porter MD;  Location:  OR    ORTHOPEDIC SURGERY Left     ORIF elbow       Relevant Family History: NONE    Relevant Social History: NONE     Prior to Admission medications    Medication Sig Start Date End Date Taking? Authorizing Provider   acetaminophen (TYLENOL) 500 MG tablet Take 2 tablets (1,000 mg) by mouth every 6 hours as needed for mild pain    Unknown, Entered By History   diclofenac (VOLTAREN) 1 % topical gel Apply 2 g topically 2 times  "daily 10/24/20   Reported, Patient   gabapentin (NEURONTIN) 100 MG capsule Take 4 capsules (400 mg) by mouth every evening    Unknown, Entered By History   gabapentin (NEURONTIN) 100 MG capsule Take 1 capsule (100 mg) by mouth At Bedtime    Unknown, Entered By History   gabapentin (NEURONTIN) 100 MG capsule Take 1 capsule (100 mg) by mouth every morning    Reported, Patient   hydrochlorothiazide (HYDRODIURIL) 25 MG tablet Take 1 tablet (25 mg) by mouth daily    Unknown, Entered By History   hydrocortisone acetate 1 % CREA  11/2/21   Reported, Patient   losartan (COZAAR) 50 MG tablet Take 1 tablet (50 mg) by mouth 2 times daily 8/14/23   Meghan Rodriguez MD   metFORMIN (GLUCOPHAGE) 500 MG tablet Take 1 tablet (500 mg) by mouth daily (with dinner)    Reported, Patient   Omega-3 Fatty Acids (OMEGA-3 FISH OIL PO) Take 1 capsule by mouth every morning    Reported, Patient   pantoprazole (PROTONIX) 40 MG EC tablet Take 1 tablet (40 mg) by mouth daily 1/30/23   Ezequiel Porter MD   simvastatin (ZOCOR) 20 MG tablet Take 1 tablet (20 mg) by mouth At Bedtime    Reported, Patient   triamcinolone (KENALOG) 0.5 % cream Apply topically 2 times daily as needed    Reported, Patient   valsartan (DIOVAN) 80 MG tablet Take 1 tablet (80 mg) by mouth daily    Unknown, Entered By History   Vitamin D (Cholecalciferol) 50 MCG (2000 UT) CAPS Take 2,000 Units by mouth daily    Reported, Patient       Allergies   Allergen Reactions    Amlodipine Swelling     3+ LE edema on amlodipine 5 mg.        REVIEW OF SYSTEMS:   A relevant review of systems was performed and was negative    PHYSICAL EXAM:   /75   Pulse 92   Resp 10   SpO2 98%  Estimated body mass index is 27.98 kg/m  as calculated from the following:    Height as of 1/27/23: 1.549 m (5' 1\").    Weight as of 1/30/23: 67.2 kg (148 lb 1.6 oz).   GENERAL APPEARANCE: alert, and oriented  MENTAL STATUS: alert  AIRWAY EXAM: Normal  RESP: lungs clear to auscultation - no " rales, rhonchi or wheezes  CV: regular rates and rhythm  DIAGNOSTICS:    Not indicated    IMPRESSION   ASA Class 2 - Mild systemic disease    PLAN:   Plan for EGD. We discussed the risks, benefits and alternatives and the patient wished to proceed.      Signed Electronically by: Hank Negrete MD  August 18, 2023

## 2025-08-14 ENCOUNTER — TRANSFERRED RECORDS (OUTPATIENT)
Dept: HEALTH INFORMATION MANAGEMENT | Facility: CLINIC | Age: 89
End: 2025-08-14
Payer: MEDICARE

## 2025-08-25 ENCOUNTER — MEDICAL CORRESPONDENCE (OUTPATIENT)
Dept: HEALTH INFORMATION MANAGEMENT | Facility: CLINIC | Age: 89
End: 2025-08-25
Payer: MEDICARE

## 2025-08-25 ENCOUNTER — TRANSCRIBE ORDERS (OUTPATIENT)
Dept: OTHER | Age: 89
End: 2025-08-25

## 2025-08-25 DIAGNOSIS — L57.0 ACTINIC KERATOSIS: ICD-10-CM

## 2025-08-25 DIAGNOSIS — D48.5 NEOPLASM OF UNCERTAIN BEHAVIOR OF SKIN: Primary | ICD-10-CM

## 2025-08-25 DIAGNOSIS — L72.0 EPIDERMAL CYST: ICD-10-CM

## (undated) DEVICE — KIT ENDO TURNOVER/PROCEDURE W/CLEAN A SCOPE LINERS 103888

## (undated) DEVICE — ENDO CATH BALLOON HERCULES WGW 12-13.5-15MM G51796

## (undated) DEVICE — SOL WATER IRRIG 1000ML BOTTLE 2F7114

## (undated) DEVICE — ENDO SNARE HEXAGONAL ISNARE 2.5X4.0CM W/25GA NDL

## (undated) DEVICE — INFLATION DEVICE BIG 60 ENDO-AN6012

## (undated) DEVICE — PAD CHUX UNDERPAD 30X36" P3036C

## (undated) DEVICE — BAG CLEAR TRASH 1.3M 39X33" P4040C

## (undated) DEVICE — KIT PROCEDURE W/CLEAN-A-SCOPE LINERS V2 200800

## (undated) DEVICE — TUBING SUCTION MEDI-VAC SOFT 3/16"X20' N520A

## (undated) DEVICE — BALLOON ELATION 240CML 14-15-16.5-18-19MM 5.5CM EPCX15

## (undated) DEVICE — BAG RED BIOHAZARD 37X50" 40GAL A7450PR

## (undated) RX ORDER — PROPOFOL 10 MG/ML
INJECTION, EMULSION INTRAVENOUS
Status: DISPENSED
Start: 2023-01-30

## (undated) RX ORDER — LIDOCAINE HYDROCHLORIDE 10 MG/ML
INJECTION, SOLUTION EPIDURAL; INFILTRATION; INTRACAUDAL; PERINEURAL
Status: DISPENSED
Start: 2023-01-30

## (undated) RX ORDER — ONDANSETRON 2 MG/ML
INJECTION INTRAMUSCULAR; INTRAVENOUS
Status: DISPENSED
Start: 2023-01-30

## (undated) RX ORDER — GLYCOPYRROLATE 0.2 MG/ML
INJECTION INTRAMUSCULAR; INTRAVENOUS
Status: DISPENSED
Start: 2023-01-30

## (undated) RX ORDER — FENTANYL CITRATE 50 UG/ML
INJECTION, SOLUTION INTRAMUSCULAR; INTRAVENOUS
Status: DISPENSED
Start: 2023-08-18

## (undated) RX ORDER — FENTANYL CITRATE 0.05 MG/ML
INJECTION, SOLUTION INTRAMUSCULAR; INTRAVENOUS
Status: DISPENSED
Start: 2022-10-10